# Patient Record
Sex: FEMALE | Race: OTHER | NOT HISPANIC OR LATINO | URBAN - METROPOLITAN AREA
[De-identification: names, ages, dates, MRNs, and addresses within clinical notes are randomized per-mention and may not be internally consistent; named-entity substitution may affect disease eponyms.]

---

## 2020-10-12 ENCOUNTER — EMERGENCY (EMERGENCY)
Facility: HOSPITAL | Age: 30
LOS: 1 days | Discharge: HOME | End: 2020-10-12
Admitting: STUDENT IN AN ORGANIZED HEALTH CARE EDUCATION/TRAINING PROGRAM
Payer: COMMERCIAL

## 2020-10-12 VITALS
RESPIRATION RATE: 18 BRPM | DIASTOLIC BLOOD PRESSURE: 86 MMHG | OXYGEN SATURATION: 98 % | TEMPERATURE: 98 F | HEART RATE: 100 BPM | SYSTOLIC BLOOD PRESSURE: 128 MMHG

## 2020-10-12 DIAGNOSIS — F29 UNSPECIFIED PSYCHOSIS NOT DUE TO A SUBSTANCE OR KNOWN PHYSIOLOGICAL CONDITION: ICD-10-CM

## 2020-10-12 PROCEDURE — 99283 EMERGENCY DEPT VISIT LOW MDM: CPT

## 2020-10-12 PROCEDURE — 90792 PSYCH DIAG EVAL W/MED SRVCS: CPT

## 2020-10-12 NOTE — ED BEHAVIORAL HEALTH ASSESSMENT NOTE - REFERRAL / APPOINTMENT DETAILS
Psychoeducation provided.  Provided with community resources including [facility] walk in; encouraged to follow up with Dr BERNARDA Arreguin - mother was instructed to call Dr Arreguin tomorrow to book early appt for this Pt.  Pt was not inclined to go back to Dr Arreguin's service.  She did express interest in going to our [facility].  Again, Encouraged to follow up with OP psychiatrist of choice as well as taking meds (Prozac + Risperdal)
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
## 2020-10-12 NOTE — ED BEHAVIORAL HEALTH ASSESSMENT NOTE - SUICIDE PROTECTIVE FACTORS
Identifies reasons for living/Has future plans/Cultural, spiritual and/or moral attitudes against suicide/Responsibility to family and others/Supportive social network of family or friends

## 2020-10-12 NOTE — ED BEHAVIORAL HEALTH ASSESSMENT NOTE - SUMMARY
29/F with self reported hx of depression, no past in-Pt psych admissions, is currently on follow up with a community psychiatrist, no hx of SA/ self injurious behavior and no substance abuse hx.  Today, presented to the ED BIB EMS due to agitation.     At this time, the Pt endorses delusional thoughts, perseverates about conflict with mother whom she reports that mother is "enforcing upon her, the thought that she (the Pt) is still in a relationship with a (now ex-) BF.  The Pt though at times, vague and has impaired reasoning, overall - is not disorganized in TP or floridly psychotic.  There had been hx of depression as perpetuated by separation from her BF but there is nothing to suggest that currently, this Pt is presenting with MDD with psychosis.  The level of paranoia as obtained from collateral information has not significantly caused functional impairment.  There had been an isolated aggressive behavior today and previously, Pt broke glasses x 2-3 weeks ago.  Overall though, the Pt has NOT BEEN INCREASINGLY AGGRESSIVE OR SEVERELY AFFECTIVELY DYSREGULATED.      Since her  ED arrival, the Pt has been calm and cooperative.  There has been no agitation/aggressive behavior.  No current verbalization of passive/active SI/HI.   No signs/symptoms of acute eris or florid psychosis.  Pt has no signs/ symptoms of intoxication or withdrawal.  She is not delirious.  No somatic complaints.  Pt has not tested limits.. Has maintained appropriate boundaries. Pt has been easily redirected.  Overall, no management issues. She was able to partake towards safety planning.  Mother did not object towards discharging Pt.      RECOMMENDATIONS:          1. Psychoeducation provided.  Encouraged compliance with prescribed Prozac and Risperdal.  Also encouraged to follow up with Dr Doug Cohn.  But Pt expressed that she does not want to follow up with him.  Hence, Pt was Provided with community resources including [facility] walk in.         2. Emergency protocol reviewed.  Pt and mother were adviced to call 911 or come to the nearest ED should symptoms worsen; have increasing bouts of agitation/aggressive behavior; having SI/HI.           3. no psych meds prescribed

## 2020-10-12 NOTE — ED ADULT NURSE NOTE - OBJECTIVE STATEMENT
Pt arrived to  reporting verbal altercation with mother. Pt denies S/I H/I A/H V/H. Pt changed into  clothing. Personal property collected and logged.

## 2020-10-12 NOTE — ED BEHAVIORAL HEALTH ASSESSMENT NOTE - OTHER PAST PSYCHIATRIC HISTORY (INCLUDE DETAILS REGARDING ONSET, COURSE OF ILLNESS, INPATIENT/OUTPATIENT TREATMENT)
self reported hx of depression  no past in-Pt psych admissions  currently on follow up with a community psychiatrist, Dr Doug Cohn  no hx of SA/ self injurious behavior

## 2020-10-12 NOTE — ED BEHAVIORAL HEALTH ASSESSMENT NOTE - TIME CONSULT PERFORMED
Hilda Altamirano  1948    Medication: TRAMADOL    Provider: Juan Antonio Barclay MD  Preferred Contact Number: [phone] (home)     Pharmacy:  [facility]    Patient instructed to call pharmacy directly for future refills.      Advised patient that the nurse will call if there are questions or concerns, otherwise refill processing may take 48-72 hours.      12-Oct-2020 20:26

## 2020-10-12 NOTE — ED PROVIDER NOTE - PATIENT PORTAL LINK FT
You can access the FollowMyHealth Patient Portal offered by [facility] by registering at the following website: [email or web address]. By joining [facility]’s FollowMyHealth portal, you will also be able to view your health information using other applications (apps) compatible with our system.

## 2020-10-12 NOTE — ED BEHAVIORAL HEALTH ASSESSMENT NOTE - OTHER
BENNY BRANNON STOP Reference #: [id number] 12/06/2019 12/06/2019 clonazepam 0.5 mg tablet  60 30 Loly Bermeo [id number] experiencing psychosocial stressor: unemployed, financial constraints, break up with BF (since last yr - she has not gotten over with as per mother) superficially cooperative, disinterested, guarded at times, NOT INTERNALLY PREOCCUPIED BY HX: IMPAIRED "I feel stable" distracted see HPI for details mother
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
## 2020-10-12 NOTE — ED PROVIDER NOTE - PROGRESS NOTE DETAILS
Patient cleared by psych, nontoxic and medically stable for discharge. Return precautions provided and patient understands to return to the ED for concerning or worsening signs and symptoms. Instructed to follow up with primary care physician and [facility] crisis center and agreeable. Patient's questions answered. Mother to  patient.

## 2020-10-12 NOTE — ED BEHAVIORAL HEALTH ASSESSMENT NOTE - SUICIDE RISK FACTORS
Recent onset of current/past psychiatric diagnosis/Agitation/Severe Anxiety/Panic/Psychotic disorder current/past/Mood Disorder current/past

## 2020-10-12 NOTE — ED PROVIDER NOTE - CLINICAL SUMMARY MEDICAL DECISION MAKING FREE TEXT BOX
30 y/o female presents for concern for eris no acute indication of intoxication or any other medical concerns requiring invention. Will consult psych.

## 2020-10-12 NOTE — ED PROVIDER NOTE - NORMAL, MLM
Patient comes to clinic for follow up anticoagulation visit.   Last INR 6/18/19 was 2.0.  Dose maintained per protocol.   Today's INR is 2.0 and is within goal range.    Current warfarin dosing verified with patient.  INR result is within therapeutic range and instructions faxed to [facility]. Recheck INR on 8/15/19.    Jessica Lawler PA-C is in the office today supervising the treatment.        
cecilia all pertinent systems normal

## 2020-10-12 NOTE — ED PROVIDER NOTE - NSFOLLOWUPINSTRUCTIONS_ED_ALL_ED_FT
Follow up with your primary care physician and psychiatrist in 48-72 hours.  You may also see the psychiatrist at [facility]:    [address] [place] [address]  Phone: [phone]      SEEK IMMEDIATE MEDICAL CARE IF YOU HAVE ANY OF THE FOLLOWING SYMPTOMS: thoughts about hurting or killing yourself, thoughts about hurting or killing somebody else, hallucinations or any other worsening or persistent symptoms OR ANY NEW OR CONCERNING SYMPTOMS.
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
## 2020-10-12 NOTE — ED ADULT TRIAGE NOTE - CHIEF COMPLAINT QUOTE
p/t with hx depression, had a verbal altercation with mom, denies any SI or HI, appears calm and cooperative, p/t not compliant with meds

## 2020-10-12 NOTE — ED BEHAVIORAL HEALTH ASSESSMENT NOTE - DESCRIPTION
Since her ED arrival, the Pt has been calm and cooperative.  There has been no agitation/aggressive behavior.  No current verbalization of passive/active SI/HI.   No signs/symptoms of acute eris or florid psychosis.  Does not appear internally stimulated.  Pt has no signs/ symptoms of intoxication or withdrawal.  She is not delirious.  No somatic complaints.  Pt has not tested limits.. Has maintained appropriate boundaries. Pt has been easily redirected.  Overall, no management issues.    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 likes to watch TV. Not Yazidi

## 2020-10-12 NOTE — ED BEHAVIORAL HEALTH ASSESSMENT NOTE - RISK ASSESSMENT
Risk factors:   Modifiable factors: anxiety, paranoia  unmodifiable factors: previous hx of depression, break up with BF, unemployed, not in treatment/ not compliant  Protective factors: stable housing, family is supportive, no past hx of in-Pt psych admissions, no hx of SA/SIB, no substance abuse hx, no complex medical issues nor chronic pain, not intoxicated or in withdrawal, no acute objective findings of suicidality or homicidality, not delirious, help seeking, no family hx of SA or mental illness, no access to guns, no pending legal issues    At this time given all risks taken into consideration, the Pt is at low acute risk of harming self as well as remaining at elevated chronic risk.  At this time, no indication to warrant involuntary admission.  Pt was offered voluntary admission but she refused Low Acute Suicide Risk

## 2020-10-12 NOTE — ED BEHAVIORAL HEALTH ASSESSMENT NOTE - SAFETY PLAN ADDT'L DETAILS
Provision of National Suicide Prevention Lifeline 9-822-456-BLIT (9299)/Education provided regarding environmental safety / lethal means restriction/Safety plan discussed with...

## 2020-10-12 NOTE — ED ADULT NURSE NOTE - NSIMPLEMENTINTERV_GEN_ALL_ED
Implemented All Universal Safety Interventions:  [place] to call system. Call bell, personal items and telephone within reach. Instruct patient to call for assistance. Room bathroom lighting operational. Non-slip footwear when patient is off stretcher. Physically safe environment: no spills, clutter or unnecessary equipment. Stretcher in lowest position, wheels locked, appropriate side rails in place.

## 2020-10-13 NOTE — ED ADULT NURSE REASSESSMENT NOTE - NS ED NURSE REASSESS COMMENT FT1
Pt cleared for d/c by NP. Pt denies S/I H/I A/H V/H. Pt's mother is scheduled to arrive for pickup. Pt provided with d/c paperwork.
Pt is MC for dc to home, Pt is awake, a&ox3, calm. Denies current s/i h/i intent or plan. DC with mom at this time via private auto.

## 2020-11-28 ENCOUNTER — INPATIENT (INPATIENT)
Facility: HOSPITAL | Age: 30
LOS: 22 days | Discharge: HOME | End: 2020-12-21
Attending: PSYCHIATRY & NEUROLOGY | Admitting: PSYCHIATRY & NEUROLOGY
Payer: COMMERCIAL

## 2020-11-28 VITALS
SYSTOLIC BLOOD PRESSURE: 125 MMHG | HEART RATE: 89 BPM | OXYGEN SATURATION: 99 % | DIASTOLIC BLOOD PRESSURE: 85 MMHG | RESPIRATION RATE: 18 BRPM | TEMPERATURE: 99 F

## 2020-11-28 DIAGNOSIS — F32.9 MAJOR DEPRESSIVE DISORDER, SINGLE EPISODE, UNSPECIFIED: ICD-10-CM

## 2020-11-28 LAB
ALBUMIN SERPL ELPH-MCNC: 4.9 G/DL — SIGNIFICANT CHANGE UP (ref 3.3–5)
ALP SERPL-CCNC: 50 U/L — SIGNIFICANT CHANGE UP (ref 40–120)
ALT FLD-CCNC: 7 U/L — SIGNIFICANT CHANGE UP (ref 4–33)
ANION GAP SERPL CALC-SCNC: 15 MMO/L — HIGH (ref 7–14)
APAP SERPL-MCNC: < 15 UG/ML — LOW (ref 15–25)
AST SERPL-CCNC: 13 U/L — SIGNIFICANT CHANGE UP (ref 4–32)
BASOPHILS # BLD AUTO: 0.06 K/UL — SIGNIFICANT CHANGE UP (ref 0–0.2)
BASOPHILS NFR BLD AUTO: 0.5 % — SIGNIFICANT CHANGE UP (ref 0–2)
BILIRUB SERPL-MCNC: 0.8 MG/DL — SIGNIFICANT CHANGE UP (ref 0.2–1.2)
BUN SERPL-MCNC: 12 MG/DL — SIGNIFICANT CHANGE UP (ref 7–23)
CALCIUM SERPL-MCNC: 9.7 MG/DL — SIGNIFICANT CHANGE UP (ref 8.4–10.5)
CHLORIDE SERPL-SCNC: 100 MMOL/L — SIGNIFICANT CHANGE UP (ref 98–107)
CO2 SERPL-SCNC: 22 MMOL/L — SIGNIFICANT CHANGE UP (ref 22–31)
CREAT SERPL-MCNC: 0.57 MG/DL — SIGNIFICANT CHANGE UP (ref 0.5–1.3)
EOSINOPHIL # BLD AUTO: 0.03 K/UL — SIGNIFICANT CHANGE UP (ref 0–0.5)
EOSINOPHIL NFR BLD AUTO: 0.2 % — SIGNIFICANT CHANGE UP (ref 0–6)
ETHANOL BLD-MCNC: < 10 MG/DL — SIGNIFICANT CHANGE UP
GLUCOSE SERPL-MCNC: 102 MG/DL — HIGH (ref 70–99)
HCG SERPL-ACNC: < 5 MIU/ML — SIGNIFICANT CHANGE UP
HCT VFR BLD CALC: 40.1 % — SIGNIFICANT CHANGE UP (ref 34.5–45)
HGB BLD-MCNC: 14.1 G/DL — SIGNIFICANT CHANGE UP (ref 11.5–15.5)
IMM GRANULOCYTES NFR BLD AUTO: 0.5 % — SIGNIFICANT CHANGE UP (ref 0–1.5)
LYMPHOCYTES # BLD AUTO: 1.22 K/UL — SIGNIFICANT CHANGE UP (ref 1–3.3)
LYMPHOCYTES # BLD AUTO: 9.4 % — LOW (ref 13–44)
MCHC RBC-ENTMCNC: 29.3 PG — SIGNIFICANT CHANGE UP (ref 27–34)
MCHC RBC-ENTMCNC: 35.2 % — SIGNIFICANT CHANGE UP (ref 32–36)
MCV RBC AUTO: 83.2 FL — SIGNIFICANT CHANGE UP (ref 80–100)
MONOCYTES # BLD AUTO: 0.73 K/UL — SIGNIFICANT CHANGE UP (ref 0–0.9)
MONOCYTES NFR BLD AUTO: 5.6 % — SIGNIFICANT CHANGE UP (ref 2–14)
NEUTROPHILS # BLD AUTO: 10.84 K/UL — HIGH (ref 1.8–7.4)
NEUTROPHILS NFR BLD AUTO: 83.8 % — HIGH (ref 43–77)
NRBC # FLD: 0 K/UL — SIGNIFICANT CHANGE UP (ref 0–0)
PLATELET # BLD AUTO: 367 K/UL — SIGNIFICANT CHANGE UP (ref 150–400)
PMV BLD: 8.9 FL — SIGNIFICANT CHANGE UP (ref 7–13)
POTASSIUM SERPL-MCNC: 4 MMOL/L — SIGNIFICANT CHANGE UP (ref 3.5–5.3)
POTASSIUM SERPL-SCNC: 4 MMOL/L — SIGNIFICANT CHANGE UP (ref 3.5–5.3)
PROT SERPL-MCNC: 7.9 G/DL — SIGNIFICANT CHANGE UP (ref 6–8.3)
RBC # BLD: 4.82 M/UL — SIGNIFICANT CHANGE UP (ref 3.8–5.2)
RBC # FLD: 12.3 % — SIGNIFICANT CHANGE UP (ref 10.3–14.5)
SALICYLATES SERPL-MCNC: < 5 MG/DL — LOW (ref 15–30)
SARS-COV-2 RNA SPEC QL NAA+PROBE: SIGNIFICANT CHANGE UP
SODIUM SERPL-SCNC: 137 MMOL/L — SIGNIFICANT CHANGE UP (ref 135–145)
TSH SERPL-MCNC: 2.88 UIU/ML — SIGNIFICANT CHANGE UP (ref 0.27–4.2)
WBC # BLD: 12.95 K/UL — HIGH (ref 3.8–10.5)
WBC # FLD AUTO: 12.95 K/UL — HIGH (ref 3.8–10.5)

## 2020-11-28 PROCEDURE — 99285 EMERGENCY DEPT VISIT HI MDM: CPT | Mod: GC

## 2020-11-28 RX ORDER — HALOPERIDOL DECANOATE 100 MG/ML
2 INJECTION INTRAMUSCULAR EVERY 4 HOURS
Refills: 0 | Status: DISCONTINUED | OUTPATIENT
Start: 2020-11-29 | End: 2020-12-21

## 2020-11-28 RX ORDER — RISPERIDONE 4 MG/1
1 TABLET ORAL ONCE
Refills: 0 | Status: COMPLETED | OUTPATIENT
Start: 2020-11-28 | End: 2020-11-28

## 2020-11-28 RX ORDER — DIPHENHYDRAMINE HCL 50 MG
25 CAPSULE ORAL EVERY 4 HOURS
Refills: 0 | Status: DISCONTINUED | OUTPATIENT
Start: 2020-11-29 | End: 2020-12-21

## 2020-11-28 RX ORDER — RISPERIDONE 4 MG/1
1 TABLET ORAL AT BEDTIME
Refills: 0 | Status: DISCONTINUED | OUTPATIENT
Start: 2020-11-29 | End: 2020-11-30

## 2020-11-28 RX ADMIN — RISPERIDONE 1 MILLIGRAM(S): 4 TABLET ORAL at 15:23

## 2020-11-28 NOTE — ED PROVIDER NOTE - OBJECTIVE STATEMENT
29 y/o M  hx MDD   BIBA secondary to  increase sadness,   insomnia  and depressed mood. admits to inability to cope secondary to multiple social stressors.  Admits  to medication non-compliance. Patient mother states that patient has been verbally abusive and has not been eating much.  Denies pain, SOB, fever, chills, chest/abdominal discomfort. Denies use of alcohol or illicit drugs.  Denies SI/HI/AH/VH.  Denies falling , punching or kicking any  objects. No evidence of physical injuries, broken  skin or deformities.
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
## 2020-11-28 NOTE — ED BEHAVIORAL HEALTH ASSESSMENT NOTE - OTHER
experiencing psychosocial stressor: unemployed, financial constraints, break up with BF (since last yr - she has not gotten over with as per mother) "Stable"

## 2020-11-28 NOTE — ED BEHAVIORAL HEALTH ASSESSMENT NOTE - SUICIDE RISK FACTORS
Mood Disorder current/past/Psychotic disorder current/past/Recent onset of current/past psychiatric diagnosis/Agitation/Severe Anxiety/Panic

## 2020-11-28 NOTE — ED ADULT NURSE REASSESSMENT NOTE - NS ED NURSE REASSESS COMMENT FT1
Break Coverage - Pt calm & cooperative denies si/hi/avh presently pt made aware of admission pending Covid results. eval on going.
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
## 2020-11-28 NOTE — ED BEHAVIORAL HEALTH ASSESSMENT NOTE - SUICIDE PROTECTIVE FACTORS
Responsibility to family and others/Supportive social network of family or friends/Has future plans/Identifies reasons for living/Cultural, spiritual and/or moral attitudes against suicide

## 2020-11-28 NOTE — ED BEHAVIORAL HEALTH ASSESSMENT NOTE - CASE SUMMARY
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 She was BIBEMS activated by her mother for worsening ADLs.  She deny denies symptoms of psychosis, eris, depression, SI/HI/VI, but presents with vague, evasive, and illogical. Collateral from family and outpatient provider describes significant decline in function - unemployment due to paranoia, insomnia, internal reoccupation/talking to self, anorexia, irritability and agitation, which his likely secondary to MDD with psychosis vs primary psychotic disorder and will require an involuntary admission for treatment due to poor medication compliance.  Plan: invol admit under 9.39, [facility]N pending medical clearance, start risperidone 1mg QHS, lorazepam 1mg + Haldol 2mg PRN agitation

## 2020-11-28 NOTE — ED PROVIDER NOTE - CONSTITUTIONAL MANNER
Called patient back about pregnancy test result. Patient is not intendent to save her pregnancy. She was explained, that her insurance does not cover . She decided to call her insurance to clarify her options.      Electronically signed by:ALEX NOGUERA M.D.  2018 10:41AM CST     appropriate for situation

## 2020-11-28 NOTE — ED BEHAVIORAL HEALTH ASSESSMENT NOTE - RISK ASSESSMENT
Mod: Psychosis, irritability, thought disorder, tx non-compliance  Unmod: Age  Protective: Not suicidal, no hx of hospitalization, supportive family, high-functioning baseline    Elevated risk due to current symptoms, requires inpatient admission. Low Acute Suicide Risk

## 2020-11-28 NOTE — ED BEHAVIORAL HEALTH ASSESSMENT NOTE - SUMMARY
Radha is a 29yo SE  woman (domiciled with family, single, non-cg, unemployed but formerly worked as pharmacist) with PPHx of MDD/psychosis (no admissions, no suicide attempts, no substance use, in treatment with Dr Doug Arreguin [phone]) no PMHx. She was BIBEMS activated by her mother for worsening ADLs.    Radha denies symptoms of psychosis, eris, depression, SI/HI/VI, but presents with vague and illogical TP. Collateral from family and outpatient provider describes significant decline in function - unemployment due to paranoia, insomnia, internal preoccupation/talking to self, anorexia, irritability and agitation. Not adherent with treatment per her own report. Impression is psychosis (with unclear mood component), and collateral indicates that she has not improved despite multiple outpatient interventions. Given lack of insight, failure of outpatient treatment and acute symptoms, Radha requires inpatient admission.

## 2020-11-28 NOTE — ED PROVIDER NOTE - CLINICAL SUMMARY MEDICAL DECISION MAKING FREE TEXT BOX
Labs, Urine Tox/UA   Medical evaluation performed. There is no clinical evidence of intoxication or any acute medical problem requiring immediate intervention. Patient is awaiting psychiatric consultation. Final disposition will be determined by psychiatrist. 29 y/o M  hx MDD     Labs, Urine Tox/UA , HCG   Medical evaluation performed. There is no clinical evidence of intoxication or any acute medical problem requiring immediate intervention. Patient is awaiting psychiatric consultation. Final disposition will be determined by psychiatrist.

## 2020-11-28 NOTE — ED BEHAVIORAL HEALTH ASSESSMENT NOTE - DESCRIPTION
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 likes to watch TV. Not [employer]
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
## 2020-11-28 NOTE — ED ADULT NURSE REASSESSMENT NOTE - NS ED NURSE REASSESS COMMENT FT1
Pt is being admitted to [facility] 1N for psychosis.  Pt. remained in good behavioral control, ate, and provided with fluids.  Pt. was COVID negative and is being escorted with female staff and security to [facility].

## 2020-11-28 NOTE — ED BEHAVIORAL HEALTH ASSESSMENT NOTE - AXIS IV
Problems with primary support/Economic problems/Occupational problems/Problem related to social environment

## 2020-11-28 NOTE — ED ADULT NURSE NOTE - OBJECTIVE STATEMENT
Pt states that she was just sleeping when her mom called the  on her.  Pt denies SI/HI, AH/VH.  Pt calm and cooperative upon interview.

## 2020-11-28 NOTE — ED ADULT NURSE NOTE - NSIMPLEMENTINTERV_GEN_ALL_ED
Implemented All Universal Safety Interventions:  [place] to call system. Call bell, personal items and telephone within reach. Instruct patient to call for assistance. Room bathroom lighting operational. Non-slip footwear when patient is off stretcher. Physically safe environment: no spills, clutter or unnecessary equipment. Stretcher in lowest position, wheels locked, appropriate side rails in place.

## 2020-11-28 NOTE — ED BEHAVIORAL HEALTH NOTE - BEHAVIORAL HEALTH NOTE
COVID Exposure Screen- collateral (i.e. third-party, chart review, belongings, etc; include EMS and ED staff) - completed by mother    1.        *Has the patient had a COVID-19 test in the last 21 days?  (  ) Yes   (x) No   (  ) Unknown- Reason: Had test while working in June 2020, negative result/antibody  IF YES PROCEED TO QUESTION #2. IF NO OR UNKNOWN THEN PLEASE SKIP TO QUESTION #3.  2.        Date of test: ________  3.        Do you know the result? (  ) Negative   (  ) Positive   (  ) No result available  4.        *In the past 14 days, has the patient been around anyone with a positive COVID-19 test?*  (  ) Yes   (x) No   (  ) Unknown- Reason (e.g. collateral uncertain, refusing to answer, etc.):  ______  IF YES PROCEED TO QUESTION #5. IF NO or UNKNOWN, PLEASE SKIP TO QUESTION #10  5.        Was the patient within 6 feet of them for at least 15 minutes? (  ) Yes   (  ) No   (  ) Unknown- Reason: ______   6.        Did the patient provide care for them? (  ) Yes   (  ) No   (  ) Unknown- Reason: ______   7.        Did the patient have direct physical contact with them (touched, hugged, or kissed them)? (  ) Yes   (  ) No    (  ) Unknown- Reason: ______   8.        Did the patient share eating or drinking utensils with them? (  ) Yes   (  ) No    (  ) Unknown- Reason: ______   9.        Have they sneezed, coughed, or somehow got respiratory droplets on the patient? (  ) Yes   (  ) No    (  ) Unknown- Reason: ______   10.     *Have you been out of [place] within the past 14 days?*  (  ) Yes   (x) No   (  ) Unknown- Reason (e.g. patient uncertain, sedated, refusing to answer, etc.): _______  IF YES PLEASE ANSWER THE FOLLOWING QUESTIONS:  11.     Which state/country have you been to? ______  12.     Were you there over 24 hours? (  ) Yes   (  ) No    (  ) Unknown- Reason: ______  13.     Date of return to [facility]: ______

## 2020-11-28 NOTE — ED ADULT TRIAGE NOTE - CHIEF COMPLAINT QUOTE
pt no complaint with medication, extra depressed., not preforming ADLS, mother called EMS. pt refusing vitals

## 2020-11-28 NOTE — ED PROVIDER NOTE - INTERNATIONAL TRAVEL
History  Chief Complaint   Patient presents with    Mouth Lesions     I have had a painful abscess to my right upper gum line for 2 days now  It is at the site of a fractured tooth  History provided by:  Patient   used: No    Medical Problem   Location:  Pt with tooth pain and facial pain and swelling  Severity:  Moderate  Onset quality:  Gradual  Duration:  2 days  Timing:  Constant  Progression:  Unchanged  Chronicity:  New  Associated symptoms: no abdominal pain, no chest pain, no congestion, no cough, no diarrhea, no ear pain, no fatigue, no fever, no headaches, no loss of consciousness, no myalgias, no nausea, no rash, no rhinorrhea, no shortness of breath, no sore throat, no vomiting and no wheezing        None       No past medical history on file  Past Surgical History:   Procedure Laterality Date    APPENDECTOMY       SECTION      CHOLECYSTECTOMY      TONSILLECTOMY         No family history on file  I have reviewed and agree with the history as documented  Social History   Substance Use Topics    Smoking status: Not on file    Smokeless tobacco: Not on file    Alcohol use Not on file        Review of Systems   Constitutional: Negative  Negative for fatigue and fever  HENT: Positive for dental problem  Negative for congestion, ear pain, rhinorrhea and sore throat  Eyes: Negative  Respiratory: Negative  Negative for cough, shortness of breath and wheezing  Cardiovascular: Negative  Negative for chest pain  Gastrointestinal: Negative  Negative for abdominal pain, diarrhea, nausea and vomiting  Endocrine: Negative  Genitourinary: Negative  Musculoskeletal: Negative  Negative for myalgias  Skin: Negative  Negative for rash  Allergic/Immunologic: Negative  Neurological: Negative  Negative for loss of consciousness and headaches  Hematological: Negative  Psychiatric/Behavioral: Negative      All other systems reviewed and are negative  Physical Exam  Physical Exam   Constitutional: She is oriented to person, place, and time  She appears well-developed and well-nourished  HENT:   Head: Normocephalic and atraumatic  Right Ear: External ear normal    Left Ear: External ear normal    Nose: Nose normal    Mouth/Throat: Oropharynx is clear and moist    Right upper 1st molar site pain  Chipped off tooth weeks ago  Swelling facial       Pharmacy called pt cant afford cleocin will change to amoxil 500tid x 10 days at pt;s request  Pt understantds cleocin is better option    Eyes: Conjunctivae and EOM are normal  Pupils are equal, round, and reactive to light  Neck: Normal range of motion  Neck supple  Cardiovascular: Normal rate, regular rhythm and normal heart sounds  Pulmonary/Chest: Effort normal and breath sounds normal    Abdominal: Soft  Bowel sounds are normal    Musculoskeletal: Normal range of motion  Neurological: She is alert and oriented to person, place, and time  Skin: Skin is warm  Psychiatric: She has a normal mood and affect  Her behavior is normal  Judgment and thought content normal    Nursing note and vitals reviewed        Vital Signs  ED Triage Vitals [07/22/18 0952]   Temperature Pulse Respirations Blood Pressure SpO2   98 °F (36 7 °C) 69 16 137/85 98 %      Temp Source Heart Rate Source Patient Position - Orthostatic VS BP Location FiO2 (%)   Temporal Monitor Sitting Left arm --      Pain Score       8           Vitals:    07/22/18 0952   BP: 137/85   Pulse: 69   Patient Position - Orthostatic VS: Sitting       Visual Acuity      ED Medications  Medications - No data to display    Diagnostic Studies  Results Reviewed     None                 No orders to display              Procedures  Procedures       Phone Contacts  ED Phone Contact    ED Course                               MDM  CritCare Time    Disposition  Final diagnoses:   Dental abscess     Time reflects when diagnosis was documented in both MDM as applicable and the Disposition within this note     Time User Action Codes Description Comment    7/22/2018 10:24 AM Yuri Piper Add [K04 7] Dental abscess       ED Disposition     ED Disposition Condition Comment    Discharge  Babita ALEX Outler discharge to home/self care  Condition at discharge: Good        Follow-up Information     Follow up With Specialties Details Why [address]    [address]  [address] [facility]          Discharge Medication List as of 7/22/2018 10:26 AM      START taking these medications    Details   clindamycin (CLEOCIN) 150 mg capsule Take 2 capsules (300 mg total) by mouth every 6 (six) hours for 10 days, Starting Sun 7/22/2018, Until Wed 8/1/2018, Print      ibuprofen (MOTRIN) 600 mg tablet Take 1 tablet (600 mg total) by mouth every 6 (six) hours as needed for mild pain (pain), Starting Sun 7/22/2018, Print           No discharge procedures on file      ED Provider  Electronically Signed by           Haven Sen PA-C  07/22/18 7872 No

## 2020-11-29 LAB — HBA1C BLD-MCNC: 4.8 % — SIGNIFICANT CHANGE UP (ref 4–5.6)

## 2020-11-29 PROCEDURE — 99221 1ST HOSP IP/OBS SF/LOW 40: CPT

## 2020-11-29 RX ADMIN — RISPERIDONE 1 MILLIGRAM(S): 4 TABLET ORAL at 21:58

## 2020-11-29 NOTE — ED BEHAVIORAL HEALTH NOTE - BEHAVIORAL HEALTH NOTE
Worker called [employer] ([phone]) and Jaspreet STEVENSON who approved days of 11/28-12/7 and review on 12/6 and reviewer to be determined. auth #[id number].

## 2020-11-30 LAB
CHOLEST SERPL-MCNC: 195 MG/DL — SIGNIFICANT CHANGE UP (ref 120–199)
DIRECT LDL: 152 MG/DL — SIGNIFICANT CHANGE UP
HDLC SERPL-MCNC: 44 MG/DL — LOW (ref 45–65)
TRIGL SERPL-MCNC: 71 MG/DL — SIGNIFICANT CHANGE UP (ref 10–149)

## 2020-11-30 PROCEDURE — 99232 SBSQ HOSP IP/OBS MODERATE 35: CPT | Mod: GC

## 2020-11-30 RX ORDER — RISPERIDONE 4 MG/1
1 TABLET ORAL AT BEDTIME
Refills: 0 | Status: DISCONTINUED | OUTPATIENT
Start: 2020-11-30 | End: 2020-12-01

## 2020-11-30 RX ADMIN — RISPERIDONE 1 MILLIGRAM(S): 4 TABLET ORAL at 20:29

## 2020-12-01 PROCEDURE — 99232 SBSQ HOSP IP/OBS MODERATE 35: CPT | Mod: GC

## 2020-12-01 PROCEDURE — 90832 PSYTX W PT 30 MINUTES: CPT

## 2020-12-01 RX ORDER — RISPERIDONE 4 MG/1
2 TABLET ORAL AT BEDTIME
Refills: 0 | Status: DISCONTINUED | OUTPATIENT
Start: 2020-12-01 | End: 2020-12-11

## 2020-12-01 RX ADMIN — RISPERIDONE 2 MILLIGRAM(S): 4 TABLET ORAL at 21:39

## 2020-12-02 PROCEDURE — 99232 SBSQ HOSP IP/OBS MODERATE 35: CPT | Mod: GC

## 2020-12-02 RX ADMIN — RISPERIDONE 2 MILLIGRAM(S): 4 TABLET ORAL at 21:11

## 2020-12-03 PROCEDURE — 99232 SBSQ HOSP IP/OBS MODERATE 35: CPT | Mod: GC

## 2020-12-03 RX ADMIN — RISPERIDONE 2 MILLIGRAM(S): 4 TABLET ORAL at 20:31

## 2020-12-04 PROCEDURE — 99232 SBSQ HOSP IP/OBS MODERATE 35: CPT | Mod: GC

## 2020-12-04 RX ADMIN — RISPERIDONE 2 MILLIGRAM(S): 4 TABLET ORAL at 21:28

## 2020-12-05 RX ADMIN — RISPERIDONE 2 MILLIGRAM(S): 4 TABLET ORAL at 21:04

## 2020-12-06 RX ADMIN — RISPERIDONE 2 MILLIGRAM(S): 4 TABLET ORAL at 21:38

## 2020-12-07 PROCEDURE — 99232 SBSQ HOSP IP/OBS MODERATE 35: CPT

## 2020-12-07 RX ADMIN — RISPERIDONE 2 MILLIGRAM(S): 4 TABLET ORAL at 21:01

## 2020-12-08 PROCEDURE — 99232 SBSQ HOSP IP/OBS MODERATE 35: CPT

## 2020-12-08 RX ADMIN — RISPERIDONE 2 MILLIGRAM(S): 4 TABLET ORAL at 20:29

## 2020-12-09 PROCEDURE — 99232 SBSQ HOSP IP/OBS MODERATE 35: CPT

## 2020-12-09 RX ADMIN — RISPERIDONE 2 MILLIGRAM(S): 4 TABLET ORAL at 20:37

## 2020-12-09 NOTE — BH SCALES AND SCREENS - NSBPRSCONCDISORG_PSY_ALL_CORE
4 = Moderate - e.g., occasional irrelevant statements, infrequent use of neologisms, or moderate loosening of associations

## 2020-12-09 NOTE — BH SCALES AND SCREENS - NSBPRSBLUNTAFFECT_PSY_ALL_CORE
2 = Very Mild – e.g., occasionally seems indifferent to material that is usually accompanied by some show of emotion

## 2020-12-09 NOTE — BH SCALES AND SCREENS - NSBPRSUNUTHOCON_PSY_ALL_CORE
4 = Moderate – full delusional conviction, but delusion(s) has little or no influence on behavior None known

## 2020-12-10 PROCEDURE — 99232 SBSQ HOSP IP/OBS MODERATE 35: CPT

## 2020-12-10 RX ADMIN — RISPERIDONE 2 MILLIGRAM(S): 4 TABLET ORAL at 21:40

## 2020-12-10 NOTE — BH CHART NOTE - NSPSYPRGNOTEFT_PSY_ALL_CORE
Psychologist approached Ms. KINGSLEY to re-assess interest in individual therapy.  Ms. KINGSLEY declined, stating that she would prefer to meet with her psychiatrist.  Psychoeducation about treatment team roles was provided and participation in individual therapy in addition to meetings with her psychiatrist was encouraged.  Ms. KINGSLEY again declined.  During interaction, Ms. KINGSLEY was guarded and demonstrated a disorganized and vague thought process. Will discuss with the treatment team.

## 2020-12-11 PROCEDURE — 99232 SBSQ HOSP IP/OBS MODERATE 35: CPT

## 2020-12-11 RX ORDER — RISPERIDONE 4 MG/1
2.5 TABLET ORAL AT BEDTIME
Refills: 0 | Status: DISCONTINUED | OUTPATIENT
Start: 2020-12-11 | End: 2020-12-14

## 2020-12-11 RX ADMIN — RISPERIDONE 2.5 MILLIGRAM(S): 4 TABLET ORAL at 20:48

## 2020-12-11 NOTE — BH CHART NOTE - NSEVENTNOTEFT_PSY_ALL_CORE
Patient was admitted on an emergency status on 11/28/20 due to change in behavior, acting on paranoid delusions, with decreased sleep and food intake. despite an ongoing treatment patient continues to display guarded and suspicious attitude, impoverishment and pathological vagusness/ambivalence of thought process, very poor insight and judgment. Patient's legal status is expiring but she can not be safely discharged at this time. Therefore, patient's legal status is being converted to in-voluntary.

## 2020-12-12 RX ADMIN — RISPERIDONE 2.5 MILLIGRAM(S): 4 TABLET ORAL at 20:47

## 2020-12-13 RX ADMIN — RISPERIDONE 2.5 MILLIGRAM(S): 4 TABLET ORAL at 20:53

## 2020-12-14 PROCEDURE — 99232 SBSQ HOSP IP/OBS MODERATE 35: CPT

## 2020-12-14 RX ORDER — RISPERIDONE 4 MG/1
3 TABLET ORAL AT BEDTIME
Refills: 0 | Status: DISCONTINUED | OUTPATIENT
Start: 2020-12-14 | End: 2020-12-21

## 2020-12-14 RX ADMIN — RISPERIDONE 3 MILLIGRAM(S): 4 TABLET ORAL at 21:27

## 2020-12-15 PROCEDURE — 99232 SBSQ HOSP IP/OBS MODERATE 35: CPT

## 2020-12-15 RX ADMIN — RISPERIDONE 3 MILLIGRAM(S): 4 TABLET ORAL at 20:20

## 2020-12-16 PROCEDURE — 99232 SBSQ HOSP IP/OBS MODERATE 35: CPT

## 2020-12-16 RX ADMIN — RISPERIDONE 3 MILLIGRAM(S): 4 TABLET ORAL at 20:40

## 2020-12-16 NOTE — BH SCALES AND SCREENS - NSBPRSUNUTHOCON_PSY_ALL_CORE
5 = Moderately Severe – full delusional conviction, but delusion(s) has only occasional impact on behavior

## 2020-12-17 RX ADMIN — RISPERIDONE 3 MILLIGRAM(S): 4 TABLET ORAL at 20:31

## 2020-12-17 NOTE — BH TREATMENT PLAN - NSTXPLANTHERAPYSESSIONSFT_PSY_ALL_CORE
12-11-20  Type of therapy: Leisure development,Peer advocate,Other,Dance and Movement Therapy  Type of session: Individual  Level of patient participation: Not engaged,Participated with encouragement,Quiet  Duration of participation: Less than 15 minutes  Therapy conducted by: Psych rehab  Therapy Summary: Over the past seven days, patient has demonstrated minimal progress towards psychiatric rehabilitation goals. Patient continues to report feeling much better, however, is unable to further elaborate how "feeling much better" relates to symptoms, and is unable to identify what therapeutic interventions or psychotropic medications have contributed to reported improvement. Patient was unable to identify any coping skills she has utilized over the past even days, nor is patient able to identify activities which she enjoys at this time. Patient has attended approximately 15 percent of psychiatric rehabilitation groups over the past seven days. Patient is minimally verbal or engaged in groups, however, attends with prompting and encouragement. Patient is slightly more visible on the unit, at times observed walking around the unit or sitting by herself in the community. Patient engages minimally with peers. Patient is able verbalize needs/feelings with staff. Patient is slightly more engaged in treatment.    12-16-20  Type of therapy: Coping skills,Medication management,Safety planning  Type of session: Individual  Level of patient participation: Quiet  Duration of participation: Less than 15 minutes  Therapy conducted by: Nursing  --

## 2020-12-17 NOTE — BH TREATMENT PLAN - NSTXPSYCHOGOAL_PSY_ALL_CORE
Will verbalize decreased distress related to psychosis to 2 on a 10-point scale
Will verbalize decreased distress related to psychosis to 2 on a 10-point scale
Will identify 2 coping skills that assist with focus on reality

## 2020-12-17 NOTE — BH TREATMENT PLAN - NSTXCAREGIVERPARTICIPATE_PSY_P_CORE
Family/Caregiver participated in identification of needs/problems/goals for treatment
Family/Caregiver participated in development of after care plan/Family/Caregiver participated in defining interventions/Family/Caregiver participated in identification of needs/problems/goals for treatment
Family/Caregiver participated in identification of needs/problems/goals for treatment

## 2020-12-17 NOTE — BH TREATMENT PLAN - NSBHPRIMARYDX_PSY_ALL_CORE
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    
Paranoid schizophrenia    
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

## 2020-12-17 NOTE — BH TREATMENT PLAN - NSPTSTATEDGOAL_PSY_ALL_CORE
To be ready for discharge
To be ready for discharge
"To have my space, not to be muddled, influenced"

## 2020-12-17 NOTE — BH TREATMENT PLAN - NSTXCOPEINTERPR_PSY_ALL_CORE
Patient will attend daily symptom management groups and meet with psychiatric rehabilitation staff individually in order to identify one to two coping skills to manage symptoms

## 2020-12-17 NOTE — BH TREATMENT PLAN - NSTXPSYCHOINTERSW_PSY_ALL_CORE
SW met with pt for support and psychoeducation. Pt gave consent to speak with mother. SW has been in contact with pts motehr and brother. SW completed IR for Centers.

## 2020-12-17 NOTE — BH TREATMENT PLAN - NSTXPATIENTPARTICIPATE_PSY_ALL_CORE
Patient participated in identification of needs/problems/goals for treatment
Patient participated in identification of needs/problems/goals for treatment
Patient participated in identification of needs/problems/goals for treatment/Patient participated in defining interventions/Patient participated in development of after care plan

## 2020-12-18 PROCEDURE — 99232 SBSQ HOSP IP/OBS MODERATE 35: CPT

## 2020-12-18 RX ORDER — RISPERIDONE 4 MG/1
1 TABLET ORAL
Qty: 1 | Refills: 0
Start: 2020-12-18 | End: 2021-01-16

## 2020-12-18 RX ADMIN — RISPERIDONE 3 MILLIGRAM(S): 4 TABLET ORAL at 21:11

## 2020-12-18 NOTE — BH DISCHARGE NOTE NURSING/SOCIAL WORK/PSYCH REHAB - PATIENT PORTAL LINK FT
You can access the FollowMyHealth Patient Portal offered by [facility] by registering at the following website: [email or web address]. By joining [facility]’s FollowMyHealth portal, you will also be able to view your health information using other applications (apps) compatible with our system.

## 2020-12-18 NOTE — BH DISCHARGE NOTE NURSING/SOCIAL WORK/PSYCH REHAB - NSDCPRRECOMMEND_PSY_ALL_CORE
Patient would benefit from returning home and beginning treatment at [facility] for medication management, therapy, and support.

## 2020-12-18 NOTE — BH DISCHARGE NOTE NURSING/SOCIAL WORK/PSYCH REHAB - NSDCPRGOAL_PSY_ALL_CORE
Writer met with patient to discuss patient’s discharge and progress towards rehabilitation goals. Patient was admitted to [facility] on 11/28/2020 due to agitation and worsening psychosis. Patient has demonstrated improvements in symptoms during current hospitalization. Patient stated that she feels more organized and aware of herself compared to when she was first admitted. She also demonstrated improved insight and stated that she realizes that she struggles with transitions. Patient was unable to identify what contributed to her improvements but stated that she wanted to thank the staff for all their help. Patient expressed looking forward to going home and returning to a familiar environment. Patient demonstrated future-oriented thought process. Due to the COVID-19 pandemic, unit structure and activities are re-evaluated on a consistent basis in effort to maintain the safety of patients and staff. Patient minimally participated in psych rehab groups. Patient became increasingly more visible on the unit throughout hospitalization and maintained appropriate behavior control. Patient interacted minimally with peers. Patient presented as pleasant and cooperative when meeting with writer. Patient was able to engage in safety planning. Patient was provided with a survey prior to discharge.

## 2020-12-18 NOTE — BH INPATIENT PSYCHIATRY PROGRESS NOTE - NSBHADMITMEDEDUDETAILS_PSY_A_CORE FT
Patient continues to display significant paranoia surrounding her relationship with her Mom, still with cognitive processing and verbalization deficits, stereotypies, pathological hesitation. Tolerates treatment well.  Continue Risperidone trial at 3 mg qhs
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   Pt continues to endorse fear of being influenced by others – and as a result she isolates, avoids contact with peers and family. She continues to provide vague, impoverished answers to questions. C/w Risperdal 2mg T-tab. no side effects.Pt reports that she had an MRI – ordered by her PCP – and it resulted unremarkable. Nutrition consulted, recommended regular diet, no need for supplementation at this time. Nursing staff will document BMI. DDX includes schizophrenia vs MDD with psychosis. Pt requires inpatient hospitalization for safety, stabilization and medication optimization.    Treatmetn Plan:  - legals 9.39  - obs: routine  - meds: Risperdal M-tab, 2mg ( Pt is refusing to accept higher doses.)    - Nutrition consult completed – c/w regular diet, no supplementation necessary at this time  - updated BMI: 17.6 (12/4/20)  Dispo: pending stabilization  
Patient continues to display significant paranoia with cognitive processing and verbalization deficits, stereotypies, pathological hesitation. Tolerates treatment well.  Continue Risperidone 2 mg qhs
Patient continues to display significant paranoia with cognitive processing and verbalization deficits, stereotypies, pathological hesitation. Tolerates treatment well.  Continue Risperidone trial at 3 mg qhs
Patient continues to display significant paranoia with cognitive processing and verbalization deficits, stereotypies, pathological hesitation. Tolerates treatment well.  Continue Risperidone trial and increase dose to 3 mg qhs
Patient continues to display significant paranoia with cognitive processing and verbalization deficits, stereotypies, pathological hesitation. Tolerates treatment well.  Continue Risperidone trial at 3 mg qhs

## 2020-12-18 NOTE — BH DISCHARGE NOTE NURSING/SOCIAL WORK/PSYCH REHAB - NSBHREFERPURPOSE1_PSY_ALL_CORE
1) Patient must respond to phone call from Centers team to register by phone prior to scheduled appointment in order to for appointment to take place.  1) It is expected that the patient arrives to the Zoom appointment on his/her own using the above Zoom ID.  Patient will NOT be receiving an email with a Zoom link.  2) For the appointment to take place the patient must arrive on time.  3) The patient must have access to a private space for the length of the intake appointment./Mental Health Treatment/Other...

## 2020-12-18 NOTE — BH DISCHARGE NOTE NURSING/SOCIAL WORK/PSYCH REHAB - NSCDUDCCRISIS_PSY_A_CORE
[facility]  1 [phone] [facility]-WELL ([phone])  Text "WELL" to 62076  Website: www.Innolight/.[facility] 1 (880) 911-VMSY (1600) Website: www.safehorizon.org/.National Suicide Prevention Lifeline 3 (665) 512-0511/.  [facility]  1 (232) LIF[facility] (010-9353)/.  [facility]  [phone] [place] [address] [phone]   Hours:  Monday through Friday from 9 AM to 3 PM/.  [employer] [facility] Crisis Line  7 [phone], Option 1

## 2020-12-18 NOTE — BH DISCHARGE NOTE NURSING/SOCIAL WORK/PSYCH REHAB - NSBHDCREFEROTHER1FT_PSY_A_CORE
Please inform the patient of the followin) Patient must respond to phone call from Centers team to register by phone prior to scheduled appointment in order to for appointment to take place.  1) It is expected that the patient arrives to the Zoom appointment on his/her own using the above Zoom ID.  Patient will NOT be receiving an email with a Zoom link.  2) For the appointment to take place the patient must arrive on time.  3) The patient must have access to a private space for the length of the intake appointment.

## 2020-12-18 NOTE — BH INPATIENT PSYCHIATRY PROGRESS NOTE - NSBHCONSDANGEROTHERS_PSY_A_CORE
assaultive behavior
high risk for assault
assaultive behavior
high risk for assault

## 2020-12-19 RX ADMIN — RISPERIDONE 3 MILLIGRAM(S): 4 TABLET ORAL at 20:36

## 2020-12-20 RX ADMIN — RISPERIDONE 3 MILLIGRAM(S): 4 TABLET ORAL at 21:27

## 2020-12-21 VITALS — WEIGHT: 102.07 LBS

## 2020-12-21 NOTE — BH INPATIENT PSYCHIATRY PROGRESS NOTE - NSBHMETABOLICLABS_PSY_ALL_CORE
Labs within last 12 months

## 2020-12-21 NOTE — BH INPATIENT PSYCHIATRY PROGRESS NOTE - NSCGIIMPROVESX_PSY_ALL_CORE
2 = Much improved - notably better with signficant reduction of symptoms; increase in the level of functioning but some symptoms remain
3 = Minimally improved - slightly better with little or no clinically meaningful reduction of symptoms.  Represents very little change in basic clinical status, level of care, or functional capacity.
4 = No change - symptoms remain essentially unchanged
3 = Minimally improved - slightly better with little or no clinically meaningful reduction of symptoms.  Represents very little change in basic clinical status, level of care, or functional capacity.

## 2020-12-21 NOTE — BH INPATIENT PSYCHIATRY PROGRESS NOTE - NSBHFUPINTERVALCCFT_PSY_A_CORE
Patient seen for follow up for psychosis.	
Pt reports feeling less tense. She still feels mistrustful.
Pt was seen for a follow up of psychosis.
Patient was seen individually for evaluation and discharge assessment; chart was reviewed and coordination of care provided. I also worked on discharge summary today. Overall more than 30 minutes were spent working on this case.    Patient was seen for discharge assessment and to follow on resolution of psychosis
Pt was seen for a follow up of psychosis.

## 2020-12-21 NOTE — BH INPATIENT PSYCHIATRY PROGRESS NOTE - NSBHASSESSSUMMFT_PSY_ALL_CORE
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   Pt continues to display delusional level of fear of being influenced by others – and as a result she avoids contact with family. She continues to provide vague, impoverished answers to questions due to both guardedness, severe FTD and possible negative symptoms.  PO intake has improved.  DDX includes schizophrenia vs MDD with psychosis. Pt requires inpatient hospitalization for safety, stabilization and medication optimization.     Tolerates Risperdal 3mg T-tab well.    MRI – ordered by her PCP – and its resulted unremarkable.  
Patient responded to medications and psychotherapeutic interventions as well as to structure and supportive interventions on the unit with decline in paranoia, improvement in PO intake, ADLS, mood, affect, improvement in reasoning and judgment. She is hopeful for the future, with wider ( still blunted expressiveness) affect, and able to make her needs known. She is calm and cooperative. Patient denies overwhelming anxiety and hopelessness. She is in good control; She has been consistently denying any current suicidal and homicidal/aggressive i/i/p.  She denies holding grudges and believes that she will be able to to negotiate successfully her needs in the family.  There is no motor agitation/retardation at this time.   Assessment of suicidal and aggression risks was: Patient has some significant chronic risks ( h/o Psychotic spectrum disorder, hospitalizations/discharge) but in our assessment is not an acute suicidal/homicidal/aggression risk at this time in view of subjective and objectively demonstrated clinical improvement and improvement in patient’s functioning, acceptance of arranged OP follow up, positive outlook on the future. Patient understands reasons for recommendation to continue treatment in OP setting and agrees to do so as she experiences beneficial effects of treatment.  Plan: to discharge patient today.   Patient was provided with extensive psychoeducation and with motivational counseling to encourage abstinence and compliance. Patient was instructed on actions for crisis situations, understood and agreed to follow instructions for handling crisis: including, to come to ER or call 911 should patient feel that she is in danger of hurting self or others. Safety plan was created and communicated to the patient. Patient also was given Suicide Prevention Lifeline number 1-946.914.7480 and provided with instructions on its use.   Patient was advised to avoid driving until it is clear that her reactions are not impaired by medications.  Patient was also instructed to use contraception to avoid unplanned pregnancy while on medications.  Motivational counseling was provided.  
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   Pt continues to display deluional level of fear of being influenced by others – and as a result she avoids contact with family. She continues to provide vague, impoverished answers to questions due to both guardedness, severe FTD and possible negative symptoms.  PO intake has improved.  DDX includes schizophrenia vs MDD with psychosis. Pt requires inpatient hospitalization for safety, stabilization and medication optimization.     Tolerates Risperdal 3mg T-tab well.    MRI – ordered by her PCP – and its resulted unremarkable.

## 2020-12-21 NOTE — BH INPATIENT PSYCHIATRY PROGRESS NOTE - NSICDXBHPRIMARYDX_PSY_ALL_CORE
Other specified schizophrenia spectrum and other psychotic disorder   F28  

## 2020-12-21 NOTE — BH INPATIENT PSYCHIATRY PROGRESS NOTE - NSDCCRITERIA_PSY_ALL_CORE
CGI no higher than 2

## 2020-12-21 NOTE — BH INPATIENT PSYCHIATRY PROGRESS NOTE - NSTXPSYCHOGOAL_PSY_ALL_CORE
Will identify 2 coping skills that assist with focus on reality
Will verbalize decreased distress related to psychosis to 2 on a 10-point scale
Will verbalize decreased distress related to psychosis to 2 on a 10-point scale
Make at least 5 reality based statements/requests to staff and/or peers
Will identify 2 coping skills that assist with focus on reality
Will verbalize decreased distress related to psychosis to 2 on a 10-point scale

## 2020-12-21 NOTE — BH INPATIENT PSYCHIATRY PROGRESS NOTE - NSTREATMENTCERTY_PSY_ALL_CORE

## 2020-12-21 NOTE — BH INPATIENT PSYCHIATRY PROGRESS NOTE - NSBHCONSULTIPREASON_PSY_A_CORE
danger to others; mental illness expected to respond to inpatient care

## 2020-12-21 NOTE — BH INPATIENT PSYCHIATRY PROGRESS NOTE - NSBHATTESTSEENBY_PSY_A_CORE
attending Psychiatrist without NP/Trainee
NP without Attending Psychiatrist
attending Psychiatrist without NP/Trainee

## 2020-12-21 NOTE — BH INPATIENT PSYCHIATRY PROGRESS NOTE - NSCGISEVERILLNESS_PSY_ALL_CORE
5 = Markedly ill - intrusive symptoms that distinctly impair social/occupational function or cause intrusive levels of distress
3 = Mildly ill – clearly established symptoms with minimal, if any, distress or difficulty in social and occupational function

## 2020-12-21 NOTE — BH INPATIENT PSYCHIATRY PROGRESS NOTE - CURRENT MEDICATION
MEDICATIONS  (STANDING):  risperiDONE  Disintegrating Tablet 2 milliGRAM(s) Oral at bedtime    MEDICATIONS  (PRN):  diphenhydrAMINE 25 milliGRAM(s) Oral every 4 hours PRN Assaultive behavior  diphenhydrAMINE   Injectable 25 milliGRAM(s) IntraMuscular every 4 hours PRN Assaultive behavior  haloperidol     Tablet 2 milliGRAM(s) Oral every 4 hours PRN agitation  haloperidol    Injectable 2 milliGRAM(s) IntraMuscular every 4 hours PRN Agitation  LORazepam     Tablet 1 milliGRAM(s) Oral every 4 hours PRN Agitation  LORazepam   Injectable 1 milliGRAM(s) IntraMuscular every 4 hours PRN Agitation  
MEDICATIONS  (STANDING):  risperiDONE  Disintegrating Tablet 3 milliGRAM(s) Oral at bedtime    MEDICATIONS  (PRN):  diphenhydrAMINE 25 milliGRAM(s) Oral every 4 hours PRN Assaultive behavior  diphenhydrAMINE   Injectable 25 milliGRAM(s) IntraMuscular every 4 hours PRN Assaultive behavior  haloperidol     Tablet 2 milliGRAM(s) Oral every 4 hours PRN agitation  haloperidol    Injectable 2 milliGRAM(s) IntraMuscular every 4 hours PRN Agitation  LORazepam     Tablet 1 milliGRAM(s) Oral every 4 hours PRN anxiety  LORazepam   Injectable 1 milliGRAM(s) IntraMuscular every 4 hours PRN Agitation  
MEDICATIONS  (STANDING):  risperiDONE  Disintegrating Tablet 2 milliGRAM(s) Oral at bedtime    MEDICATIONS  (PRN):  diphenhydrAMINE 25 milliGRAM(s) Oral every 4 hours PRN Assaultive behavior  diphenhydrAMINE   Injectable 25 milliGRAM(s) IntraMuscular every 4 hours PRN Assaultive behavior  haloperidol     Tablet 2 milliGRAM(s) Oral every 4 hours PRN agitation  haloperidol    Injectable 2 milliGRAM(s) IntraMuscular every 4 hours PRN Agitation  LORazepam     Tablet 1 milliGRAM(s) Oral every 4 hours PRN Agitation  LORazepam   Injectable 1 milliGRAM(s) IntraMuscular every 4 hours PRN Agitation  
MEDICATIONS  (STANDING):  risperiDONE  Disintegrating Tablet 2.5 milliGRAM(s) Oral at bedtime    MEDICATIONS  (PRN):  diphenhydrAMINE 25 milliGRAM(s) Oral every 4 hours PRN Assaultive behavior  diphenhydrAMINE   Injectable 25 milliGRAM(s) IntraMuscular every 4 hours PRN Assaultive behavior  haloperidol     Tablet 2 milliGRAM(s) Oral every 4 hours PRN agitation  haloperidol    Injectable 2 milliGRAM(s) IntraMuscular every 4 hours PRN Agitation  LORazepam     Tablet 1 milliGRAM(s) Oral every 4 hours PRN Agitation  LORazepam   Injectable 1 milliGRAM(s) IntraMuscular every 4 hours PRN Agitation  
MEDICATIONS  (STANDING):  risperiDONE  Disintegrating Tablet 3 milliGRAM(s) Oral at bedtime    MEDICATIONS  (PRN):  diphenhydrAMINE 25 milliGRAM(s) Oral every 4 hours PRN Assaultive behavior  diphenhydrAMINE   Injectable 25 milliGRAM(s) IntraMuscular every 4 hours PRN Assaultive behavior  haloperidol     Tablet 2 milliGRAM(s) Oral every 4 hours PRN agitation  haloperidol    Injectable 2 milliGRAM(s) IntraMuscular every 4 hours PRN Agitation  LORazepam     Tablet 1 milliGRAM(s) Oral every 4 hours PRN anxiety  LORazepam   Injectable 1 milliGRAM(s) IntraMuscular every 4 hours PRN Agitation  

## 2020-12-21 NOTE — BH INPATIENT PSYCHIATRY PROGRESS NOTE - NSTXCOPEGOAL_PSY_ALL_CORE
Identify and utilize 2 coping skills that meet their needs

## 2020-12-21 NOTE — BH INPATIENT PSYCHIATRY DISCHARGE NOTE - HOSPITAL COURSE
Dates of Hospitalization: 11/29/2020 – ___________  Upon admission, patient was guarded, isolative, impoverished. She reported “tension” with her mother, though did not understand why the police were called. She reported feeling like her mother was trying to “influence my life” but was unable to provide specific examples.   The patient was not on medications upon admission. Patient had previously been started on an SSRI but the patient stopped taking because she found it ineffective and inappropriate. 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 Medication titration was accompanied by improvements in_______________________. Patient was made aware of the risks and benefits of the medication.    Patient was under good behavioral control on the unit. Patient did not require seclusion or restraints. Throughout the hospitalization, patient was social on the unit, ate all meals, and participated meaningfully in group activities.  Medically, the patient had no problems on this unit.   Suicidal risk assessment was performed on the day of discharge. The patient is at elevated acute risk for self harm. Risk factors include hx of medication noncompliance, psychotic symptoms, poor social support. Protective factors for suicide include treatment engagement, future-oriented, lack of access to firearms, patient denies current SIIP.  Aggression and violence risk assessment was performed on the day of discharge. Low acute risk of aggression or violence. Patient has no historical or current violent, aggressive, or homicidal ideation, intent, or plan. Other protective factors patient was in good behavioral control during hospitalization, engaged in treatment.  Immediate risk was minimized by inpatient admission to a safe environment with appropriate supervision and limited access to lethal means. Future risk was minimized before discharge by treatment of psychotic symptoms, maximizing outpatient support, providing relevant patient education, discussing emergency procedures, and ensuring close follow-up. Patient denies all suicidal and aggressive/homicidal ideation, intent and plan, and, in view of demonstrated clinical improvement, is not judged to be an acute danger to self or others at this time. Although the patient remains at their chronic baseline risk of self-harm, such risk cannot be further ameliorated by continued inpatient treatment and the patient is therefore appropriate for discharge.   On the day of discharge, the patient's mood and affect are good and euthymic. Patient is not hopeless. Sleep and appetite are also at baseline.  Pt's motor performance and productivity of speech are WNL. No AVH. No VH. Patient denies S/H/I/I/P.  Patient has made clinically meaningful progress during this hospitalization and has clearly benefited from medications and psychotherapy. On day of discharge, the patient has improved significantly and no longer requires inpatient treatment and care. Pt will be discharged and follow-up with outpatient care.    Patient was provided with extensive psychoeducation on treatment options and motivational counseling targeting healthy lifestyle. Patient was instructed on actions for crisis situations, understood and agreed to follow instructions for handling crisis, including coming to ER or calling 911 should the patient or their family feel that they are in danger of hurting self or others. Patient also was given Suicide Prevention Lifeline number 2-702-553-9145 and provided with instructions on its use.   Patient will be discharged with the following DSM5 Diagnoses: Schizophrenia  Patient will be discharged on the following medications: Risperdal 3 mg M-tab      Patient responded to medications and psychotherapeutic interventions as well as to structure and supportive interventions on the unit with decline in paranoia, improvement in PO intake, ADLS, mood, affect, improvement in reasoning and judgment. She is hopeful for the future, with wider ( still blunted expressiveness) affect, and able to make her needs known. She is calm and cooperative. Patient denies overwhelming anxiety and hopelessness. She is in good control; She has been consistently denying any current suicidal and homicidal/aggressive i/i/p.  She denies holding grudges and believes that she will be able to to negotiate successfully her needs in the family.  There is no motor agitation/retardation at this time.   Assessment of suicidal and aggression risks was: Patient has some significant chronic risks ( h/o Psychotic spectrum disorder, hospitalizations/discharge) but in our assessment is not an acute suicidal/homicidal/aggression risk at this time in view of subjective and objectively demonstrated clinical improvement and improvement in patient’s functioning, acceptance of arranged OP follow up, positive outlook on the future. Patient understands reasons for recommendation to continue treatment in OP setting and agrees to do so as she experiences beneficial effects of treatment.  Plan: to discharge patient today.   Patient was provided with extensive psychoeducation and with motivational counseling to encourage abstinence and compliance. Patient was instructed on actions for crisis situations, understood and agreed to follow instructions for handling crisis: including, to come to ER or call 911 should patient feel that she is in danger of hurting self or others. Safety plan was created and communicated to the patient. Patient also was given Suicide Prevention Lifeline number 1-961.996.5626 and provided with instructions on its use.   Patient was advised to avoid driving until it is clear that her reactions are not impaired by medications.  Patient was also instructed to use contraception to avoid unplanned pregnancy while on medications.     Dates of Hospitalization: 11/29/2020 – 12/21/20_______  Upon admission, patient was guarded, isolative, impoverished. She reported “tension” with her mother, though did not understand why the police were called. She reported feeling like her mother was trying to “influence my life” but was unable to provide specific examples.   The patient was not on medications upon admission. Patient had previously been started on an SSRI but the patient stopped taking because she found it ineffective and inappropriate.   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 Medication titration was accompanied by improvements in levels of guardedness, suspiciousness, anxiety, dysphoria. Sleep and food intake improved and later normalized. Patient started to attend groups and was able to tolerate individual sessions better, albeit maintaining tendency toward evasiveness. Patient's affect brightened up and became reactive and stable. She no longer experienced her mother as causing "tension" and reported her belief that she could safely return home and find balance in creating better boundaries with her mother. She denied feeling fearful of or feeling angry at her mother or anyone else. ( Pt still declined to have a family meeting with her mother, but permitted treatment team to communicate with her family. ) Negative symptoms are present.  Patient was made aware of the risks and benefits of the medication.    Patient was under good behavioral control on the unit. Patient did not require seclusion or restraints. Throughout the hospitalization, patient was social on the unit, ate all meals, and participated meaningfully in group activities.  Medically, the patient had no problems on this unit.   Suicidal risk assessment was performed on the day of discharge. The patient is at elevated acute risk for self harm. Risk factors include hx of medication noncompliance, psychotic symptoms, poor social support. Protective factors for suicide include treatment engagement, future-oriented, lack of access to firearms, patient denies current SIIP.  Aggression and violence risk assessment was performed on the day of discharge. Low acute risk of aggression or violence. Patient has no historical or current violent, aggressive, or homicidal ideation, intent, or plan. Other protective factors patient was in good behavioral control during hospitalization, engaged in treatment.  Immediate risk was minimized by inpatient admission to a safe environment with appropriate supervision and limited access to lethal means. Future risk was minimized before discharge by treatment of psychotic symptoms, maximizing outpatient support, providing relevant patient education, discussing emergency procedures, and ensuring close follow-up. Patient denies all suicidal and aggressive/homicidal ideation, intent and plan, and, in view of demonstrated clinical improvement, is not judged to be an acute danger to self or others at this time. Although the patient remains at their chronic baseline risk of self-harm, such risk cannot be further ameliorated by continued inpatient treatment and the patient is therefore appropriate for discharge.   On the day of discharge, the patient's mood and affect are good and euthymic. Patient is not hopeless. Sleep and appetite are also at baseline.  Pt's motor performance and productivity of speech are WNL. No AVH. No VH. Patient denies S/H/I/I/P.  Patient has made clinically meaningful progress during this hospitalization and has clearly benefited from medications and psychotherapy. On day of discharge, the patient has improved significantly and no longer requires inpatient treatment and care. Pt will be discharged and follow-up with outpatient care.    Patient was provided with extensive psychoeducation on treatment options and motivational counseling targeting healthy lifestyle. Patient was instructed on actions for crisis situations, understood and agreed to follow instructions for handling crisis, including coming to ER or calling 911 should the patient or their family feel that they are in danger of hurting self or others. Patient also was given Suicide Prevention Lifeline number 6-412-584-6819 and provided with instructions on its use. Safety plan was provided.  Patient will be discharged with the following DSM5 Diagnoses: Psychotic disorde SO ( r/o Schizophrenia)  Patient will be discharged on the following medications: Risperdal 3 mg   Patient was advised to avoid driving until it is clear that her reactions are not impaired by medications.  Patient was also instructed to use contraception to avoid unplanned pregnancy while on medications.

## 2020-12-21 NOTE — BH INPATIENT PSYCHIATRY PROGRESS NOTE - MSE OPTIONS
Unstructured MSE

## 2020-12-21 NOTE — BH INPATIENT PSYCHIATRY PROGRESS NOTE - NSBHFUPINTERVALHXFT_PSY_A_CORE
c/o: Pt reports feeling overall better, needs help to define what has improved as she does not have clear idea what was "wrong" with her; she still does not want to talk about her mother or their relationship, she does not want to explain why: instead she is saying "I already told you, I already told you why" ( when in fact, she has not been able or willing to explain her reasons or specifics of her need to stay away from being with her mom or even talking about her mother.)  Patient experiences no side effects. no dizziness/lightheadedness.
  c/o: Pt reports that she does not want to talk about her mother or their relationship, she does not want to explain why, saying "I already told you, I already told you why" ( when in fact, she has not been able or willing to explain her reasons or specifics of her need to stay away from being with her mom or even talking about her mother.)  Patient experiences no side effects. no dizziness/lightheadedness.
  c/o: Pt reports that she has concerns re: her Mother "interfering and imposing", and the she "just " does not wanted "to be muddled", unable to explain what it means.  She sleeps well. She is a little less tense.   Patient experiences no side effects.
  c/o: Pt reports that she slept well and she thinks she is "doing better, but there has been nothing wrong" with her and she is displeased that she is prescribed treatment. She still feels uncomfortable with her mother who is experienced by her as "interfering"  and "imposing", and the she (patient) "just " does not wanted "to be muddled", (unable to explain what it means.)  She does not feel of look sedated or tired but continues to repeat that medicine is supposed to make her "somnolent".  Patient experiences no side effects. no dizziness/lightheadedness.
Chart reviewed and case discussed with treatment team.  No events reported overnight. Sleep and appetite is fair.  Per RN patient has been eating well and patient seen eating her breakfast this morning in her room. Patient denies reason for admission and states that she is only here because she wasn't eating at home. Patient denies any paranoia towards her family but stated that no one in her house speaks to her mother. Patient is mostly isolative to her room. Patient is compliant with medications, no adverse effects reported.  
Patient was not willing to talk to her mother.  c/o: Pt reports that for a long time she has been feeling imposed on and influenced by other people to the point that she resided to isolate to preserve her sense of autonomy. She sleeps well. She is a little less tense. she does not like anything "unpredictable - on of the obstacles for her to join groups ( often). Sleeps better.
Pt is eating and sleeping better. she entered several groups with encouragement but left early without wxplanations.
Reportedly patient at times is attempting to join groups ( with staff encouragement) but always leaves the groups shortly after. When asked about the reasons for this she is guarded and avoidant, only able to repeat "I needed to take a breather". Reluctantly, she admits that "tension" might be a reason.     Reportedly. Pt attempted to "cheek medication last night, but with staff insistency she took it.  Pt denies side effects.
Patient reports feeling relaxed during weekend. She is hopeful that she will be able to arrange her relationships with her mom in a mutually acceptable manner. She denies feeling angry or paranoid/fearful of her mom and denies caring grudges against her or anyone else. Patient denies feeling sad or depressed. Denies any suicidal/aggressive/homicidal ideations/intents/plans. She wants to be discharged home today.    As per staff, patient displayed calm demeanor, attended groups, slept and ate appropriately.

## 2020-12-21 NOTE — BH INPATIENT PSYCHIATRY PROGRESS NOTE - NSBHROSSYSTEMS_PSY_ALL_CORE
Psychiatric

## 2020-12-21 NOTE — BH INPATIENT PSYCHIATRY DISCHARGE NOTE - OTHER PAST PSYCHIATRIC HISTORY (INCLUDE DETAILS REGARDING ONSET, COURSE OF ILLNESS, INPATIENT/OUTPATIENT TREATMENT)
self reported hx of depression  no past in-Pt psych admissions  currently on follow up with a community psychiatrist, Dr Doug Cohn  no hx of SA/ self injurious behavior self reported hx of depression  no past in-Pt psych admissions  Stopped seeing a community psychiatrist, Dr Doug Cohn  no hx of SA/ self injurious behavior

## 2020-12-21 NOTE — BH INPATIENT PSYCHIATRY PROGRESS NOTE - NSTXPSYCHOINTERMD_PSY_ALL_CORE
Patient requested to be discharged.  Upon evaluation, she is considered not to be an acute risk for self or others at this time and therefore is being discharged for an OP follow up  Continue current medication: Risperidone trial
Continue current medication
Continue current medication: Risperidone trial
Continue current medication
Continue current medication
Continue current medication: Risperidone trial

## 2020-12-21 NOTE — BH INPATIENT PSYCHIATRY PROGRESS NOTE - NSBHINPTBILLING_PSY_ALL_CORE
04844 - Hospital Discharge Day Management; more than 30 min
46763 - Inpatient Moderate Complexity
15424 - Inpatient Moderate Complexity
83940 - Inpatient Moderate Complexity
32041 - Inpatient Moderate Complexity
56059 - Inpatient Moderate Complexity
66360 - Inpatient Moderate Complexity
00495 - Inpatient Moderate Complexity
68836 - Inpatient Moderate Complexity

## 2020-12-21 NOTE — BH INPATIENT PSYCHIATRY PROGRESS NOTE - PRN MEDS
MEDICATIONS  (PRN):  diphenhydrAMINE 25 milliGRAM(s) Oral every 4 hours PRN Assaultive behavior  diphenhydrAMINE   Injectable 25 milliGRAM(s) IntraMuscular every 4 hours PRN Assaultive behavior  haloperidol     Tablet 2 milliGRAM(s) Oral every 4 hours PRN agitation  haloperidol    Injectable 2 milliGRAM(s) IntraMuscular every 4 hours PRN Agitation  LORazepam     Tablet 1 milliGRAM(s) Oral every 4 hours PRN Agitation  LORazepam   Injectable 1 milliGRAM(s) IntraMuscular every 4 hours PRN Agitation  
MEDICATIONS  (PRN):  diphenhydrAMINE 25 milliGRAM(s) Oral every 4 hours PRN Assaultive behavior  diphenhydrAMINE   Injectable 25 milliGRAM(s) IntraMuscular every 4 hours PRN Assaultive behavior  haloperidol     Tablet 2 milliGRAM(s) Oral every 4 hours PRN agitation  haloperidol    Injectable 2 milliGRAM(s) IntraMuscular every 4 hours PRN Agitation  LORazepam     Tablet 1 milliGRAM(s) Oral every 4 hours PRN anxiety  LORazepam   Injectable 1 milliGRAM(s) IntraMuscular every 4 hours PRN Agitation  

## 2020-12-21 NOTE — BH INPATIENT PSYCHIATRY PROGRESS NOTE - NSBHCHARTREVIEWVS_PSY_A_CORE FT
Vital Signs Last 24 Hrs  T(C): 36.2 (14 Dec 2020 09:52), Max: 36.2 (14 Dec 2020 09:52)  T(F): 97.2 (14 Dec 2020 09:52), Max: 97.2 (14 Dec 2020 09:52)  HR: 97 (14 Dec 2020 09:52) (97 - 97)  BP: 106/72 (14 Dec 2020 09:52) (106/72 - 106/72)  BP(mean): --  RR: --  SpO2: --
Vital Signs Last 24 Hrs  T(C): 36.3 (10 Dec 2020 06:36), Max: 36.8 (09 Dec 2020 17:23)  T(F): 97.4 (10 Dec 2020 06:36), Max: 98.2 (09 Dec 2020 17:23)  HR: 87 (10 Dec 2020 06:36) (87 - 87)  BP: 111/61 (10 Dec 2020 06:36) (111/61 - 111/61)  BP(mean): --  RR: --  SpO2: --
Vital Signs Last 24 Hrs  T(C): 36.3 (11 Dec 2020 06:24), Max: 36.3 (11 Dec 2020 06:24)  T(F): 97.4 (11 Dec 2020 06:24), Max: 97.4 (11 Dec 2020 06:24)  HR: --  BP: 101/65 (11 Dec 2020 06:24) (101/65 - 101/65)  BP(mean): 96 (11 Dec 2020 06:24) (96 - 96)  RR: --  SpO2: --
Vital Signs Last 24 Hrs  T(C): 36.5 (16 Dec 2020 08:07), Max: 36.7 (15 Dec 2020 19:44)  T(F): 97.7 (16 Dec 2020 08:07), Max: 98.1 (15 Dec 2020 19:44)  HR: 102 (16 Dec 2020 08:07) (102 - 102)  BP: 108/64 (16 Dec 2020 08:07) (108/64 - 108/64)  BP(mean): --  RR: --  SpO2: --
Vital Signs Last 24 Hrs  T(C): 36.6 (08 Dec 2020 06:40), Max: 37.6 (07 Dec 2020 17:07)  T(F): 97.9 (08 Dec 2020 06:40), Max: 99.6 (07 Dec 2020 17:07)  HR: 108 (08 Dec 2020 06:40) (108 - 108)  BP: 116/78 (08 Dec 2020 06:40) (116/78 - 116/78)  BP(mean): --  RR: --  SpO2: --
Vital Signs Last 24 Hrs  T(C): 36.6 (15 Dec 2020 07:34), Max: 37.4 (14 Dec 2020 18:17)  T(F): 97.8 (15 Dec 2020 07:34), Max: 99.4 (14 Dec 2020 18:17)  HR: 95 (15 Dec 2020 07:34) (95 - 95)  BP: 99/63 (15 Dec 2020 07:34) (99/63 - 99/63)  BP(mean): --  RR: --  SpO2: --
Vital Signs Last 24 Hrs  T(C): 36.7 (09 Dec 2020 06:26), Max: 37.1 (08 Dec 2020 17:30)  T(F): 98 (09 Dec 2020 06:26), Max: 98.7 (08 Dec 2020 17:30)  HR: 88 (09 Dec 2020 06:26) (88 - 88)  BP: 103/58 (09 Dec 2020 06:26) (103/58 - 103/58)  BP(mean): --  RR: --  SpO2: --
Vital Signs Last 24 Hrs  T(C): 36.2 (18 Dec 2020 07:29), Max: 36.2 (18 Dec 2020 07:29)  T(F): 97.2 (18 Dec 2020 07:29), Max: 97.2 (18 Dec 2020 07:29)  HR: 91 (18 Dec 2020 07:29) (91 - 91)  BP: 98/60 (18 Dec 2020 07:29) (98/60 - 98/60)  BP(mean): --  RR: --  SpO2: --
Vital Signs Last 24 Hrs  T(C): 36.6 (20 Dec 2020 20:34), Max: 36.6 (20 Dec 2020 20:34)  T(F): 97.8 (20 Dec 2020 20:34), Max: 97.8 (20 Dec 2020 20:34)  HR: 97 (21 Dec 2020 08:40) (97 - 97)  BP: 105/68 (21 Dec 2020 08:40) (105/68 - 105/68)  BP(mean): --  RR: --  SpO2: --

## 2020-12-21 NOTE — BH INPATIENT PSYCHIATRY DISCHARGE NOTE - DESCRIPTION
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 likes to watch TV. Not [employer]
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
## 2020-12-21 NOTE — BH INPATIENT PSYCHIATRY DISCHARGE NOTE - NSBHDCHANDOFFFT_PSY_ALL_CORE
31 yo F PPHx of MDD(?) and psychosis presenting to [facility] with worsening ADLs and increasingly bizarre/agitated behavior at home. Pt is trained as pharmacist. Pt is paranoid that mother is trying to control her life, says there is "a lot of tension at home." But is unable to really characterize this tension; seems less evasive and more likely 2/2 to impoverishment of psychosis.  Meds: Risperdal 3mg M-tab

## 2020-12-21 NOTE — BH INPATIENT PSYCHIATRY DISCHARGE NOTE - NSDCCPCAREPLAN_GEN_ALL_CORE_FT
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

## 2020-12-21 NOTE — BH INPATIENT PSYCHIATRY PROGRESS NOTE - MSE UNSTRUCTURED FT
Patient was superficially cooperative more verbal but still impressively guarded and evasive, hesitant to disclose her thoughts; She is distinctly stereotypical in her statements which are often lacking meaningful content. Fair eye contact. No agitation/retardation. speech - Nl rate but hesitant. Mood "OK". affect: less dysphoric, wider, more reactive, blunted. Patient is displaying FTD: impoverishment, vagueness. Suspiciousness is still present. Patient denies a/v/t/hallucinations. Paranoid delusional distortions are present ( including re: mother) but patient is  reluctant to disclose her thought content. Pt denies s/h/i/i/p. modest impairment of cognitive processing. No memory/orientation problems. Insight and judgment are very poor.    
Patient was calm and cooperative with the interview, but still guarded. Fair eye contact. No agitation/retardation. Speech - normal in rate, tone and volume. Mood "ok" with blunted affect. Thought process is linear. Patient denies a/v/t/hallucinations. Paranoid delusional distortions are present ( including re: mother) but patient is  reluctant to disclose her thought content. Pt denies s/h/i/i/p. modest impairment of cognitive processing. No memory/orientation problems. Insight and judgment are very poor. Impulse control has been fair on the unit.     
Patient was hesitant re: participating in interview, but is a little brighter. Fair eye contact. No agitation/retardation. speech - Nl rate. Mood "a little better". affect: less dysphoric, wider, more reactive, blunted. Patient is displaying FTD: impoverishment, vagueness. Suspiciousness is still present. Patient denies a/v/t/hallucinations. Pt denies s/h/i/i/p. Insight and judgment are very poor.    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   Pt continues to endorse fear of being influenced by others – and as a result she isolates, avoids contact with peers and family. She continues to provide vague, impoverished answers to questions. C/w Risperdal 2mg T-tab. no side effects.Pt reports that she had an MRI – ordered by her PCP – and it resulted unremarkable. Nutrition consulted, recommended regular diet, no need for supplementation at this time. Nursing staff will document BMI. DDX includes schizophrenia vs MDD with psychosis. Pt requires inpatient hospitalization for safety, stabilization and medication optimization.    Treatmetn Plan:  - legals 9.39  - obs: routine  - meds: Risperdal M-tab, 2mg    - Nutrition consult completed – c/w regular diet, no supplementation necessary at this time  - updated BMI: 17.6 (12/4/20)  Dispo: pending stabilization  
Patient was getting progressively more tense as she started to answer questions re: her mother, she is guarded, evasive but superficially cooperative with the interview. Fair eye contact. No agitation/retardation. Speech - normal in rate, hesitant. Mood "ok" with blunted affect. Thought process is vague and impoverished. Patient denies a/v/t/hallucinations. Paranoid delusional distortions are present ( including re: mother) but patient is  reluctant to disclose her thought content. Pt denies s/h/i/i/p. modest impairment of cognitive processing. No memory/orientation problems. Insight and judgment are poor. Impulse control has been fair on the unit.     
Patient was signifficantly brighter in the beginning of interview today, more animated and with fluid speech but became progressively more tense as she started to answer questions re: her mother; she is guarded, evasive but superficially cooperative with the interview. Fair eye contact. No agitation/retardation. Speech - normal in rate, hesitant. Mood "fine" with blunted affect. Thought process is vague and impoverished. Patient denies a/v/t/hallucinations. Paranoid delusional distortions are present ( including re: mother) but patient is  reluctant to disclose her thought content. Pt denies s/h/i/i/p. Patient displays modest impairment of cognitive processing. No memory/orientation problems. Insight and judgment are poor. Impulse control has been fair on the unit.     
Patient was sitting alone in a TV room with TV set off, doing nothing and taking no initiative to engage; initially in interview she looks brighter but became progressively more dysphoric, upset, tense as she was asked questions re: her mother; she is guarded, evasive but superficially cooperative with the interview, she is a very poor informant. Fair eye contact. No agitation/retardation. Speech - normal in rate, hesitant. Mood "fine" with blunted but wider affect, Pt became tearful when questioned re: her perception of her mother. Thought process is vague and impoverished; illogical. Patient denies a/v/t/hallucinations. Paranoid delusional distortions are present ( including re: mother) but patient is  reluctant to disclose her thought content. Pt denies s/h/i/i/p. Patient displays modest impairment of cognitive processing. No memory/orientation problems. Insight and judgment are poor. Impulse control has been fair on the unit.     
Patient was superficially cooperative but guarded and evasiv, at times hesitant to disclose her thoughts but returns to her reticent manner and stereotypical and nonperforative statements. Fair eye contact. No agitation/retardation. speech - Nl rate but hesitant. Mood "a little better". affect: less dysphoric, wider, more reactive, blunted. Patient is displaying FTD: impoverishment, vagueness. Suspiciousness is still present. Patient denies a/v/t/hallucinations. Paranoid delusional distortions are likely but patient is is reluctant to disclose her thought content. Pt denies s/h/i/i/p. Insight and judgment are very poor.    
Patient is less tense initially in interview but later became tearful and dysphoric, upset, tense in response to being asked questions re: her mother and indirectly communicates that she has low hope for their relationships; she is guarded, evasive but superficially cooperative with the interview, she is a very poor informant. Fair eye contact. No agitation/retardation. Speech - normal in rate, hesitant. Mood "I am better, calmer" with blunted but wider affect, Pt became tearful when questioned re: her perception of her mother. Thought process is vague and impoverished; illogical. Patient denies a/v/t/hallucinations. Paranoid delusional distortions are present ( including re: mother) but patient is  reluctant to disclose her thought content. Pt denies s/h/i/i/p. Patient displays modest impairment of cognitive processing. No memory/orientation problems. Insight and judgment are poor. Impulse control has been fair on the unit.     
Patient is cooperative with interview, still guarded and evasive re problems in relationships but appears to be genuine in her report that she hopes to achieve reasonable arrangement in negotiating her relationship with her mother. Patient is consistently positive re: her relationship with her brother.  Fair eye contact. No agitation/retardation. Speech - normal in rate, hesitant. Mood "I am much better, calm" with blunted but dykes and more reactive affect. Thought process is vague and impoverished; less illogical. Patient denies a/v/t/hallucinations. No present evidence for delusions, although there is some mistrustfulness. Pt denies s/h/i/i/p.  No memory/orientation problems. Insight and judgment are partial. Impulse control has been good on the unit.

## 2020-12-21 NOTE — BH INPATIENT PSYCHIATRY PROGRESS NOTE - NSBHMETABOLIC_PSY_ALL_CORE_FT
BMI: BMI (kg/m2): 17.6 (12-04-20 @ 11:00)  HbA1c:   Glucose:   BP: 111/61 (12-10-20 @ 06:36) (103/58 - 116/78)  Lipid Panel: Date/Time: 11-30-20 @ 08:43  Cholesterol, Serum: 195  Direct LDL: 152  HDL Cholesterol, Serum: 44  Total Cholesterol/HDL Ration Measurement: --  Triglycerides, Serum: 71  
BMI: BMI (kg/m2): 17.6 (12-04-20 @ 11:00)  HbA1c:   Glucose:   BP: 101/65 (12-11-20 @ 06:24) (101/65 - 111/61)  Lipid Panel: Date/Time: 11-30-20 @ 08:43  Cholesterol, Serum: 195  Direct LDL: 152  HDL Cholesterol, Serum: 44  Total Cholesterol/HDL Ration Measurement: --  Triglycerides, Serum: 71  
BMI: BMI (kg/m2): 17.6 (12-04-20 @ 11:00)  HbA1c:   Glucose:   BP: 103/58 (12-09-20 @ 06:26) (103/58 - 116/78)  Lipid Panel: Date/Time: 11-30-20 @ 08:43  Cholesterol, Serum: 195  Direct LDL: 152  HDL Cholesterol, Serum: 44  Total Cholesterol/HDL Ration Measurement: --  Triglycerides, Serum: 71  
BMI: BMI (kg/m2): 17.6 (12-04-20 @ 11:00)  HbA1c:   Glucose:   BP: 106/72 (12-14-20 @ 09:52) (98/- - 115/69)  Lipid Panel: Date/Time: 11-30-20 @ 08:43  Cholesterol, Serum: 195  Direct LDL: 152  HDL Cholesterol, Serum: 44  Total Cholesterol/HDL Ration Measurement: --  Triglycerides, Serum: 71  
BMI: BMI (kg/m2): 17.6 (12-04-20 @ 11:00)  HbA1c:   Glucose:   BP: 116/78 (12-08-20 @ 06:40) (104/69 - 116/78)  Lipid Panel: Date/Time: 11-30-20 @ 08:43  Cholesterol, Serum: 195  Direct LDL: 152  HDL Cholesterol, Serum: 44  Total Cholesterol/HDL Ration Measurement: --  Triglycerides, Serum: 71  
BMI: BMI (kg/m2): 17.6 (12-04-20 @ 11:00)  HbA1c: A1C with Estimated Average Glucose: 4.8 % (11-29-20 @ 08:50)    Glucose:   BP: 108/64 (12-16-20 @ 08:07) (99/63 - 108/64)  Lipid Panel: Date/Time: 11-30-20 @ 08:43  Cholesterol, Serum: 195  Direct LDL: 152  HDL Cholesterol, Serum: 44  Total Cholesterol/HDL Ration Measurement: --  Triglycerides, Serum: 71  
BMI: BMI (kg/m2): 17.6 (12-04-20 @ 11:00)  HbA1c: A1C with Estimated Average Glucose: 4.8 % (11-29-20 @ 08:50)    Glucose:   BP: 99/63 (12-15-20 @ 07:34) (98/- - 115/69)  Lipid Panel: Date/Time: 11-30-20 @ 08:43  Cholesterol, Serum: 195  Direct LDL: 152  HDL Cholesterol, Serum: 44  Total Cholesterol/HDL Ration Measurement: --  Triglycerides, Serum: 71  
BMI: BMI (kg/m2): 17.6 (12-04-20 @ 11:00)  HbA1c: A1C with Estimated Average Glucose: 4.8 % (11-29-20 @ 08:50)    Glucose:   BP: 98/60 (12-18-20 @ 07:29) (98/60 - 108/64)  Lipid Panel: Date/Time: 11-30-20 @ 08:43  Cholesterol, Serum: 195  Direct LDL: 152  HDL Cholesterol, Serum: 44  Total Cholesterol/HDL Ration Measurement: --  Triglycerides, Serum: 71  
BMI: BMI (kg/m2): 17.6 (12-04-20 @ 11:00)  HbA1c: A1C with Estimated Average Glucose: 4.8 % (11-29-20 @ 08:50)    Glucose:   BP: 105/68 (12-21-20 @ 08:40) (92/65 - 105/68)  Lipid Panel: Date/Time: 11-30-20 @ 08:43  Cholesterol, Serum: 195  Direct LDL: 152  HDL Cholesterol, Serum: 44  Total Cholesterol/HDL Ration Measurement: --  Triglycerides, Serum: 71

## 2020-12-21 NOTE — BH INPATIENT PSYCHIATRY PROGRESS NOTE - NSBHCONTPROVIDER_PSY_ALL_CORE
Not applicable

## 2020-12-22 ENCOUNTER — OUTPATIENT (OUTPATIENT)
Dept: OUTPATIENT SERVICES | Facility: HOSPITAL | Age: 30
LOS: 1 days | Discharge: HOME | End: 2020-12-22

## 2020-12-30 DIAGNOSIS — F29 UNSPECIFIED PSYCHOSIS NOT DUE TO A SUBSTANCE OR KNOWN PHYSIOLOGICAL CONDITION: ICD-10-CM

## 2022-08-25 NOTE — ED BEHAVIORAL HEALTH ASSESSMENT NOTE - PERSONAL COLLATERAL NAME
Patient returned call, stating he will be fine with medication recommendation. And, he currently sees Dr Rosales for Cardiology.         Licha Rabago (see  note)

## 2022-11-15 ENCOUNTER — NON-APPOINTMENT (OUTPATIENT)
Age: 32
End: 2022-11-15

## 2022-11-16 PROBLEM — Z00.00 ENCOUNTER FOR PREVENTIVE HEALTH EXAMINATION: Status: ACTIVE | Noted: 2022-11-16

## 2022-11-30 ENCOUNTER — APPOINTMENT (OUTPATIENT)
Dept: UROLOGY | Facility: CLINIC | Age: 32
End: 2022-11-30

## 2022-11-30 ENCOUNTER — TRANSCRIPTION ENCOUNTER (OUTPATIENT)
Age: 32
End: 2022-11-30

## 2022-11-30 VITALS
TEMPERATURE: 98.5 F | HEART RATE: 90 BPM | DIASTOLIC BLOOD PRESSURE: 69 MMHG | OXYGEN SATURATION: 99 % | SYSTOLIC BLOOD PRESSURE: 105 MMHG | RESPIRATION RATE: 16 BRPM

## 2022-11-30 DIAGNOSIS — Z82.49 FAMILY HISTORY OF ISCHEMIC HEART DISEASE AND OTHER DISEASES OF THE CIRCULATORY SYSTEM: ICD-10-CM

## 2022-11-30 DIAGNOSIS — Z83.438 FAMILY HISTORY OF OTHER DISORDER OF LIPOPROTEIN METABOLISM AND OTHER LIPIDEMIA: ICD-10-CM

## 2022-11-30 DIAGNOSIS — Z86.59 PERSONAL HISTORY OF OTHER MENTAL AND BEHAVIORAL DISORDERS: ICD-10-CM

## 2022-11-30 DIAGNOSIS — Z83.3 FAMILY HISTORY OF DIABETES MELLITUS: ICD-10-CM

## 2022-11-30 DIAGNOSIS — Z78.9 OTHER SPECIFIED HEALTH STATUS: ICD-10-CM

## 2022-11-30 PROCEDURE — 99203 OFFICE O/P NEW LOW 30 MIN: CPT

## 2022-11-30 RX ORDER — TRAZODONE HYDROCHLORIDE 50 MG/1
50 TABLET ORAL
Qty: 30 | Refills: 0 | Status: ACTIVE | COMMUNITY
Start: 2022-09-20

## 2022-11-30 RX ORDER — NITROFURANTOIN MONOHYDRATE/MACROCRYSTALLINE 25; 75 MG/1; MG/1
100 CAPSULE ORAL
Refills: 0 | Status: ACTIVE | COMMUNITY

## 2022-11-30 RX ORDER — NITROFURANTOIN (MONOHYDRATE/MACROCRYSTALS) 25; 75 MG/1; MG/1
100 CAPSULE ORAL
Qty: 10 | Refills: 0 | Status: ACTIVE | COMMUNITY
Start: 2022-11-12

## 2022-11-30 RX ORDER — AZELAIC ACID 0.15 G/G
15 GEL TOPICAL
Qty: 50 | Refills: 0 | Status: ACTIVE | COMMUNITY
Start: 2022-09-20

## 2022-11-30 RX ORDER — CITALOPRAM HYDROBROMIDE 20 MG/1
20 TABLET, FILM COATED ORAL
Qty: 30 | Refills: 0 | Status: ACTIVE | COMMUNITY
Start: 2022-11-10

## 2022-11-30 RX ORDER — MIRABEGRON 50 MG/1
50 TABLET, FILM COATED, EXTENDED RELEASE ORAL
Qty: 30 | Refills: 6 | Status: ACTIVE | COMMUNITY
Start: 2022-11-30 | End: 1900-01-01

## 2022-11-30 RX ORDER — CITALOPRAM HYDROBROMIDE 40 MG/1
40 TABLET, FILM COATED ORAL
Qty: 30 | Refills: 0 | Status: ACTIVE | COMMUNITY
Start: 2022-11-16

## 2022-11-30 RX ORDER — CITALOPRAM HYDROBROMIDE 10 MG/1
10 TABLET, FILM COATED ORAL
Qty: 30 | Refills: 0 | Status: ACTIVE | COMMUNITY
Start: 2022-11-10

## 2022-11-30 RX ORDER — CLINDAMYCIN PHOSPHATE 10 MG/ML
1 SOLUTION TOPICAL
Qty: 30 | Refills: 0 | Status: ACTIVE | COMMUNITY
Start: 2022-09-20

## 2022-11-30 RX ORDER — BENZTROPINE MESYLATE 1 MG/1
1 TABLET ORAL
Refills: 0 | Status: ACTIVE | COMMUNITY

## 2022-11-30 NOTE — HISTORY OF PRESENT ILLNESS
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  She is complaining of several months of urinary urgency and urge incontinence.  Sometimes she cannot make it to the bathroom on time due to her job.  Coffee makes it worse.  She has no nocturia or stress incontinence.  She does not have any children.  Patient states that E. coli UTI was treated with Macrobid recently but she did not have any symptoms.  Ultrasound in November 2022 from [place] [facility] showed normal kidneys and no residual urine volume.  Patient states that she has been diagnosed with MDD and schizophrenia.  When she was given propranolol her symptoms worsened.  Patient states that pelvic exam by her gynecologist in the last year was normal.

## 2022-11-30 NOTE — REVIEW OF SYSTEMS
[Feeling Tired] : feeling tired [Recent Weight Gain (___ Lbs)] : recent [unfilled] ~Ulb weight gain [Eye Pain] : eye pain [Incontinence] : incontinence [Breast Lump] : breast lump [Suicidal] : suicidal [Sleep Disturbances] : sleep disturbances [Anxiety] : anxiety [Depression] : depression [Negative] : Heme/Lymph

## 2022-11-30 NOTE — ASSESSMENT
[FreeTextEntry1] : We had a long discussion regarding patient's urinary symptoms. Initial workup with cystoscopy, determination of urinary flow rate, post void residual volume and urodynamic study was discussed. We discussed conservative management without using medications such as timed voiding, controlling constipation, limiting fluids before bed-time, and limiting irritating substances such as coffee, tea, alcohol, spicy foods, etc. We also discussed medication therapy for treatment of urinary symptoms with anticholinergic medications (such as VESIcare, Toviaz), Myrbetriq or a combination of the above medications. Side effects of the medications discussed included but were not limited to constipation, dry mouth, change in vision, memory loss, hypertension, etc. Treatment of overactive bladder symptoms with Botox intravesical injections was reviewed. Side effects of Botox were reviewed including urinary retention, need to self-catheterize, UTIs, systemic effects of Botox, etc. Questions were answered.  Pending insurance approval she can try Myrbetriq and get back to me with her response.\par \par Veto Baires MD, FACS\par [facility]\par  of Urology\par \par [address], [address]\[place] [address]\par \par [address]\[place] [address]\par \par Tel: [phone]\par Fax: [phone]

## 2022-11-30 NOTE — LETTER BODY
Refill Approved    Rx renewed per Medication Renewal Policy. Medication was last renewed on 12/7/18.    Polly Lee, [facility] Triage/Med Refill 11/30/2019     Requested Prescriptions   Pending Prescriptions Disp Refills     amLODIPine (NORVASC) 5 MG tablet [Pharmacy Med Name: AMLODIPINE BESYLATE 5MG TABLETS] 90 tablet 0     Sig: TAKE 1 TABLET BY MOUTH DAILY       Calcium-Channel Blockers Protocol Passed - 11/30/2019  3:11 PM        Passed - PCP or prescribing provider visit in past 12 months or next 3 months     Last office visit with prescriber/PCP: 11/13/2018 Fan Contreras MD OR same dept: Visit date not found OR same specialty: 11/13/2018 Fan Contreras MD  Last physical: 4/4/2019 Last MTM visit: Visit date not found   Next visit within 3 mo: Visit date not found  Next physical within 3 mo: Visit date not found  Prescriber OR PCP: Fan Contreras MD  Last diagnosis associated with med order: 1. Hypertension  - amLODIPine (NORVASC) 5 MG tablet [Pharmacy Med Name: AMLODIPINE BESYLATE 5MG TABLETS]; TAKE 1 TABLET BY MOUTH DAILY  Dispense: 90 tablet; Refill: 0    If protocol passes may refill for 12 months if within 3 months of last provider visit (or a total of 15 months).             Passed - Blood pressure filed in past 12 months     BP Readings from Last 1 Encounters:   04/04/19 136/80                          [Dear  ___] : Dear  [unfilled], [Consult Letter:] : I had the pleasure of evaluating your patient, [unfilled]. [Consult Closing:] : Thank you very much for allowing me to participate in the care of this patient.  If you have any questions, please do not hesitate to contact me. [FreeTextEntry1] : [address]\[place] [address]\par [phone]

## 2022-12-01 LAB
APPEARANCE: CLEAR
BACTERIA: NEGATIVE
BILIRUBIN URINE: NEGATIVE
BLOOD URINE: NEGATIVE
COLOR: YELLOW
GLUCOSE QUALITATIVE U: NEGATIVE
HYALINE CASTS: 3 /LPF
KETONES URINE: NEGATIVE
LEUKOCYTE ESTERASE URINE: NEGATIVE
MICROSCOPIC-UA: NORMAL
NITRITE URINE: NEGATIVE
PH URINE: 8
PROTEIN URINE: ABNORMAL
RED BLOOD CELLS URINE: 3 /HPF
SPECIFIC GRAVITY URINE: 1.02
SQUAMOUS EPITHELIAL CELLS: 10 /HPF
UROBILINOGEN URINE: NORMAL
WHITE BLOOD CELLS URINE: 2 /HPF

## 2022-12-02 DIAGNOSIS — N39.41 URGE INCONTINENCE: ICD-10-CM

## 2022-12-02 LAB — BACTERIA UR CULT: NORMAL

## 2022-12-06 ENCOUNTER — NON-APPOINTMENT (OUTPATIENT)
Age: 32
End: 2022-12-06

## 2023-03-06 ENCOUNTER — APPOINTMENT (OUTPATIENT)
Dept: UROLOGY | Facility: CLINIC | Age: 33
End: 2023-03-06

## 2023-03-23 NOTE — BH DISCHARGE NOTE NURSING/SOCIAL WORK/PSYCH REHAB - NSBHDCAPPT1DT_PSY_A_CORE
18-Dec-2020 09:00 Rx Refill Note    Requested Prescriptions     Pending Prescriptions Disp Refills   • busPIRone (BUSPAR) 10 MG tablet 180 tablet 0     Sig: Take 1 tablet by mouth 2 (Two) Times a Day.   • meclizine (ANTIVERT) 25 MG tablet 90 tablet 0     Sig: Take 1 tablet by mouth 3 (Three) Times a Day As Needed for Dizziness or Nausea.   • pravastatin (PRAVACHOL) 40 MG tablet 90 tablet 0     Sig: Take 1 tablet by mouth Daily.   • omeprazole (priLOSEC) 40 MG capsule 90 capsule 0     Sig: Take 1 capsule by mouth Daily.   • losartan (COZAAR) 50 MG tablet 90 tablet 0     Sig: Take 1 tablet by mouth Daily.        Last office visit with prescribing clinician: 2/13/2023      Next office visit with prescribing clinician: 3/23/2023   Last labs:   Last refill: needs   Pharmacy (be sure to add in Epic). correct               22-Dec-2020 09:00

## 2024-01-03 NOTE — BH INPATIENT PSYCHIATRY PROGRESS NOTE - NSTXPROBPSYCHO_PSY_ALL_CORE
PSYCHOTIC SYMPTOMS
yes

## 2024-04-11 NOTE — BH PATIENT PROFILE - SPECIFY REASON UNABLE TO ASSESS:
[place] patient refused

## 2024-11-21 NOTE — ED ADULT TRIAGE NOTE - CCCP TRG CHIEF CMPLNT
psychiatric evaluation
Assistance OOB with selected safe patient handling equipment if applicable/Assistance with ambulation/Communicate risk of Fall with Harm to all staff, patient, and family/Monitor gait and stability/Provide patient with walking aids/Provide visual cue: red socks, yellow wristband, yellow gown, etc/Reinforce activity limits and safety measures with patient and family/Bed in lowest position, wheels locked, appropriate side rails in place/Call bell, personal items and telephone in reach/Instruct patient to call for assistance before getting out of bed/chair/stretcher/Non-slip footwear applied when patient is off stretcher/[place] to call system/Physically safe environment - no spills, clutter or unnecessary equipment/Purposeful Proactive Rounding/Room/bathroom lighting operational, light cord in reach